# Patient Record
Sex: MALE | Race: WHITE | Employment: FULL TIME | URBAN - METROPOLITAN AREA
[De-identification: names, ages, dates, MRNs, and addresses within clinical notes are randomized per-mention and may not be internally consistent; named-entity substitution may affect disease eponyms.]

---

## 2018-01-05 ENCOUNTER — TELEPHONE (OUTPATIENT)
Dept: NEPHROLOGY | Facility: CLINIC | Age: 53
End: 2018-01-05

## 2018-01-05 NOTE — TELEPHONE ENCOUNTER
Patient hasn't been seen since about 2011 (per patient)  He was previously advised that [facility] is not seeing new patients - I advised him that he would be a new patient if he hasn't been seen since 2011 and patient states he would like to discuss this with [address]

## 2018-01-26 ENCOUNTER — OFFICE VISIT (OUTPATIENT)
Dept: NEPHROLOGY | Facility: CLINIC | Age: 53
End: 2018-01-26

## 2018-01-26 VITALS
HEIGHT: 72 IN | DIASTOLIC BLOOD PRESSURE: 88 MMHG | WEIGHT: 244 LBS | SYSTOLIC BLOOD PRESSURE: 136 MMHG | HEART RATE: 71 BPM | BODY MASS INDEX: 33.05 KG/M2

## 2018-01-26 DIAGNOSIS — Z00.00 ROUTINE MEDICAL EXAM: Primary | ICD-10-CM

## 2018-01-26 PROCEDURE — 99396 PREV VISIT EST AGE 40-64: CPT | Performed by: INTERNAL MEDICINE

## 2018-01-27 ENCOUNTER — LAB ENCOUNTER (OUTPATIENT)
Dept: LAB | Age: 53
End: 2018-01-27
Attending: INTERNAL MEDICINE
Payer: COMMERCIAL

## 2018-01-27 DIAGNOSIS — Z00.00 ROUTINE MEDICAL EXAM: ICD-10-CM

## 2018-01-27 LAB
ALBUMIN SERPL BCP-MCNC: 3.9 G/DL (ref 3.5–4.8)
ALBUMIN/GLOB SERPL: 1.3 {RATIO} (ref 1–2)
ALP SERPL-CCNC: 41 U/L (ref 32–100)
ALT SERPL-CCNC: 29 U/L (ref 17–63)
ANION GAP SERPL CALC-SCNC: 5 MMOL/L (ref 0–18)
AST SERPL-CCNC: 21 U/L (ref 15–41)
BACTERIA UR QL AUTO: NEGATIVE /HPF
BASOPHILS # BLD: 0 K/UL (ref 0–0.2)
BASOPHILS NFR BLD: 1 %
BILIRUB SERPL-MCNC: 0.9 MG/DL (ref 0.3–1.2)
BILIRUB UR QL: NEGATIVE
BUN SERPL-MCNC: 10 MG/DL (ref 8–20)
BUN/CREAT SERPL: 9 (ref 10–20)
CALCIUM SERPL-MCNC: 9.5 MG/DL (ref 8.5–10.5)
CHLORIDE SERPL-SCNC: 104 MMOL/L (ref 95–110)
CHOLEST SERPL-MCNC: 231 MG/DL (ref 110–200)
CLARITY UR: CLEAR
CO2 SERPL-SCNC: 30 MMOL/L (ref 22–32)
COLOR UR: YELLOW
CREAT SERPL-MCNC: 1.11 MG/DL (ref 0.5–1.5)
EOSINOPHIL # BLD: 0.1 K/UL (ref 0–0.7)
EOSINOPHIL NFR BLD: 2 %
ERYTHROCYTE [DISTWIDTH] IN BLOOD BY AUTOMATED COUNT: 12.7 % (ref 11–15)
GLOBULIN PLAS-MCNC: 3 G/DL (ref 2.5–3.7)
GLUCOSE SERPL-MCNC: 118 MG/DL (ref 70–99)
GLUCOSE UR-MCNC: NEGATIVE MG/DL
HCT VFR BLD AUTO: 43.3 % (ref 41–52)
HDLC SERPL-MCNC: 30 MG/DL
HGB BLD-MCNC: 14.7 G/DL (ref 13.5–17.5)
HGB UR QL STRIP.AUTO: NEGATIVE
KETONES UR-MCNC: NEGATIVE MG/DL
LDLC SERPL CALC-MCNC: 150 MG/DL (ref 0–99)
LEUKOCYTE ESTERASE UR QL STRIP.AUTO: NEGATIVE
LYMPHOCYTES # BLD: 1.2 K/UL (ref 1–4)
LYMPHOCYTES NFR BLD: 37 %
MCH RBC QN AUTO: 30.1 PG (ref 27–32)
MCHC RBC AUTO-ENTMCNC: 33.9 G/DL (ref 32–37)
MCV RBC AUTO: 88.8 FL (ref 80–100)
MONOCYTES # BLD: 0.3 K/UL (ref 0–1)
MONOCYTES NFR BLD: 10 %
NEUTROPHILS # BLD AUTO: 1.6 K/UL (ref 1.8–7.7)
NEUTROPHILS NFR BLD: 50 %
NITRITE UR QL STRIP.AUTO: NEGATIVE
NONHDLC SERPL-MCNC: 201 MG/DL
OSMOLALITY UR CALC.SUM OF ELEC: 288 MOSM/KG (ref 275–295)
PH UR: 7 [PH] (ref 5–8)
PLATELET # BLD AUTO: 173 K/UL (ref 140–400)
PMV BLD AUTO: 7.1 FL (ref 7.4–10.3)
POTASSIUM SERPL-SCNC: 4.2 MMOL/L (ref 3.3–5.1)
PROT SERPL-MCNC: 6.9 G/DL (ref 5.9–8.4)
PROT UR-MCNC: 30 MG/DL
RBC # BLD AUTO: 4.87 M/UL (ref 4.5–5.9)
RBC #/AREA URNS AUTO: 2 /HPF
SODIUM SERPL-SCNC: 139 MMOL/L (ref 136–144)
SP GR UR STRIP: 1.02 (ref 1–1.03)
TRIGL SERPL-MCNC: 256 MG/DL (ref 1–149)
TSH SERPL-ACNC: 0.82 UIU/ML (ref 0.45–5.33)
UROBILINOGEN UR STRIP-ACNC: <2
VIT C UR-MCNC: NEGATIVE MG/DL
WBC # BLD AUTO: 3.2 K/UL (ref 4–11)
WBC #/AREA URNS AUTO: <1 /HPF

## 2018-01-27 PROCEDURE — 36415 COLL VENOUS BLD VENIPUNCTURE: CPT

## 2018-01-27 PROCEDURE — 81001 URINALYSIS AUTO W/SCOPE: CPT

## 2018-01-27 PROCEDURE — 84443 ASSAY THYROID STIM HORMONE: CPT

## 2018-01-27 PROCEDURE — 85025 COMPLETE CBC W/AUTO DIFF WBC: CPT

## 2018-01-27 PROCEDURE — 80053 COMPREHEN METABOLIC PANEL: CPT

## 2018-01-27 PROCEDURE — 87389 HIV-1 AG W/HIV-1&-2 AB AG IA: CPT

## 2018-01-27 PROCEDURE — 80061 LIPID PANEL: CPT

## 2018-01-27 NOTE — PATIENT INSTRUCTIONS
Please check your blood pressures daily and call me in 1 week. Do labs as ordered. See Dr. Vick Kim for follow-up.

## 2018-01-27 NOTE — PROGRESS NOTES
[facility], [facility]  Nephrology Daily Progress Note    Khoi Centeno  [id number]  47year old  Patient presents with:  Physical      HPI:   Khoi Centeno is a 47year old male.   71-year-old male with a history of hypercholesterolemia, migraine headaches and chroni lbs   Height 72.000 - 72.000   BODY MASS INDEX 29.29 33.09 -       VITALS: WEIGHT ONLY 10/19/2011 1/26/2018   Weight 216 lbs 244 lbs       Constitutional: appears well hydrated alert and responsive no acute distress noted  Neck/Thyroid: neck is supple with follow-up colonoscopy and should also reviewed the postprandial hiccups. Will call with results and determine follow-up. See yearly or as needed.            Orders Placed This Encounter      CBC W Differential W Platelet      Comp Metabolic Panel (14)

## 2018-01-29 LAB — HIV1+2 AB SERPL QL IA: NONREACTIVE

## 2018-01-30 ENCOUNTER — TELEPHONE (OUTPATIENT)
Dept: NEPHROLOGY | Facility: CLINIC | Age: 53
End: 2018-01-30

## 2018-01-30 DIAGNOSIS — R73.9 HYPERGLYCEMIA: Primary | ICD-10-CM

## 2018-01-30 DIAGNOSIS — E78.00 PURE HYPERCHOLESTEROLEMIA: ICD-10-CM

## 2018-01-31 NOTE — TELEPHONE ENCOUNTER
Labs okay except cholesterol is elevated. Higher than it was before. Fasting blood sugar now also borderline elevated. He really needs to work on diet, exercise and weight loss. Can refer to a dietitian if he wants. HIV was negative.   White count was

## 2018-02-01 NOTE — TELEPHONE ENCOUNTER
Contacted pt. Notified him of MKK's result message below. He agrees to start working on improving diet, exercise, and weight loss. He will repeat his labs in 3 months to re-evaluate.  Explained that if there isn't enough improvement on next set of labs, [address] [facility]

## 2018-04-02 ENCOUNTER — OFFICE VISIT (OUTPATIENT)
Dept: GASTROENTEROLOGY | Facility: CLINIC | Age: 53
End: 2018-04-02

## 2018-04-02 ENCOUNTER — TELEPHONE (OUTPATIENT)
Dept: GASTROENTEROLOGY | Facility: CLINIC | Age: 53
End: 2018-04-02

## 2018-04-02 VITALS
BODY MASS INDEX: 32.37 KG/M2 | HEART RATE: 73 BPM | WEIGHT: 239 LBS | DIASTOLIC BLOOD PRESSURE: 80 MMHG | SYSTOLIC BLOOD PRESSURE: 128 MMHG | HEIGHT: 72 IN

## 2018-04-02 DIAGNOSIS — Z12.11 SCREENING FOR COLORECTAL CANCER: ICD-10-CM

## 2018-04-02 DIAGNOSIS — Z12.12 SCREENING FOR COLORECTAL CANCER: ICD-10-CM

## 2018-04-02 DIAGNOSIS — K21.9 GASTROESOPHAGEAL REFLUX DISEASE, ESOPHAGITIS PRESENCE NOT SPECIFIED: Primary | ICD-10-CM

## 2018-04-02 DIAGNOSIS — K59.1 FUNCTIONAL DIARRHEA: ICD-10-CM

## 2018-04-02 PROCEDURE — 99212 OFFICE O/P EST SF 10 MIN: CPT | Performed by: INTERNAL MEDICINE

## 2018-04-02 PROCEDURE — 99243 OFF/OP CNSLTJ NEW/EST LOW 30: CPT | Performed by: INTERNAL MEDICINE

## 2018-04-02 RX ORDER — LOPERAMIDE HYDROCHLORIDE 2 MG/1
2 CAPSULE ORAL 4 TIMES DAILY PRN
COMMUNITY
End: 2019-06-25 | Stop reason: ALTCHOICE

## 2018-04-02 NOTE — PROGRESS NOTES
HPI:    Patient ID: Shanae Reddy is a 47year old male. HPI  Nawaf Pan presents for a discussion regarding symptoms possibly due to reflux and colorectal cancer screening. He has not been seen since 2009.     As per previous notes the patient has a very long- Solution Take as directed Disp: 1 Bottle Rfl: 0     Allergies:  Radiology Contrast *    Swelling  [place] [Sulfur]        Unknown   PHYSICAL EXAM:   Physical Exam   Constitutional: He is oriented to person, place, and time.  He appears well-developed and we 275 - 295 mOsm/kg 288   GFR, Non-      >=60 >60   GFR, -American      >=60 >60   WBC      4.0 - 11.0 K/UL 3.2 (L)   RBC      4.50 - 5.90 M/UL 4.87   Hemoglobin      13.5 - 17.5 g/dL 14.7   Hematocrit      41.0 - 52.0 % 43.3   MCV not performed was discussed as well. The patient is agreeable to proceeding with a colonoscopy which will be arranged following a Suprep preparation and IV sedation. ASA 1    3.  Functional diarrhea  Symptoms are reasonably well controlled with loperamid

## 2018-04-02 NOTE — TELEPHONE ENCOUNTER
Scheduled for:  Colonoscopy 30511  Provider Name: Dr Domingo Amador  Date:  Fri 6/22/18  Location:  [facility]  Sedation:  IV  Time:  7:30 am, pt aware that [facility] will call day before procedure with arrival time  Prep: split dose suprep, hard copy given to pt  Pt aware to zoe

## 2018-04-02 NOTE — PATIENT INSTRUCTIONS
1.  Lose weight, avoid evening snacks/coffee. 2.  Raise the head of the bed with a mattress wedge or bed blocks. 3.  Please contact me if the symptoms continue. 4.  Schedule screening colonoscopy following a Suprep and IV sedation.

## 2018-06-15 ENCOUNTER — TELEPHONE (OUTPATIENT)
Dept: GASTROENTEROLOGY | Facility: CLINIC | Age: 53
End: 2018-06-15

## 2018-06-15 NOTE — TELEPHONE ENCOUNTER
Pt removed from provider schedule and cancellation form sent to [address] 1 month CLN recall placed into [facility].

## 2018-07-16 ENCOUNTER — TELEPHONE (OUTPATIENT)
Dept: GASTROENTEROLOGY | Facility: CLINIC | Age: 53
End: 2018-07-16

## 2018-07-16 NOTE — TELEPHONE ENCOUNTER
----- Message from Estella Paredes RN sent at 6/15/2018 11:18 AM CDT -----  Regardin month CLN recall reminder  Pt removed from provider schedule and cancellation form sent to [facility]. 1 month CLN recall placed into EPIC. See TE from 6/15/18, pt cancelled.

## 2018-11-10 ENCOUNTER — LAB ENCOUNTER (OUTPATIENT)
Dept: LAB | Age: 53
End: 2018-11-10
Attending: INTERNAL MEDICINE
Payer: COMMERCIAL

## 2018-11-10 DIAGNOSIS — E78.00 PURE HYPERCHOLESTEROLEMIA: ICD-10-CM

## 2018-11-10 DIAGNOSIS — R73.9 HYPERGLYCEMIA: ICD-10-CM

## 2018-11-10 PROCEDURE — 80061 LIPID PANEL: CPT

## 2018-11-10 PROCEDURE — 82947 ASSAY GLUCOSE BLOOD QUANT: CPT

## 2018-11-10 PROCEDURE — 85025 COMPLETE CBC W/AUTO DIFF WBC: CPT

## 2018-11-10 PROCEDURE — 83036 HEMOGLOBIN GLYCOSYLATED A1C: CPT

## 2018-11-10 PROCEDURE — 36415 COLL VENOUS BLD VENIPUNCTURE: CPT

## 2018-11-12 ENCOUNTER — TELEPHONE (OUTPATIENT)
Dept: NEPHROLOGY | Facility: CLINIC | Age: 53
End: 2018-11-12

## 2018-11-12 DIAGNOSIS — E78.00 PURE HYPERCHOLESTEROLEMIA: Primary | ICD-10-CM

## 2018-11-12 DIAGNOSIS — R73.9 ELEVATED BLOOD SUGAR: ICD-10-CM

## 2018-11-12 NOTE — TELEPHONE ENCOUNTER
Contacted pt. Relayed MKK's message below. He admits that he wasn't good about eating healthier and exercising regularly last time.  I gave him the actual comparisons to his LDL and how it's increased and explained the cardiovascular risk factor this entail

## 2018-11-12 NOTE — TELEPHONE ENCOUNTER
Patient contacted. Results message read to patient. He states he has not really been watching his diet and exercise very carefully and would like to try doing this first before starting any medication.  He would like to know when he should do lab work again

## 2018-11-12 NOTE — TELEPHONE ENCOUNTER
Blood sugars still borderline high but a little better. Chol still too high. Start Crestor 10 mg 1 qd #30, refills 3. Liver and lipid panel in 1 mo. Watch diet and exercise.

## 2019-04-15 ENCOUNTER — TELEPHONE (OUTPATIENT)
Dept: NEPHROLOGY | Facility: CLINIC | Age: 54
End: 2019-04-15

## 2019-05-26 ENCOUNTER — APPOINTMENT (OUTPATIENT)
Dept: LAB | Facility: HOSPITAL | Age: 54
End: 2019-05-26
Attending: INTERNAL MEDICINE
Payer: COMMERCIAL

## 2019-05-26 DIAGNOSIS — E78.00 PURE HYPERCHOLESTEROLEMIA: ICD-10-CM

## 2019-05-26 DIAGNOSIS — R73.9 ELEVATED BLOOD SUGAR: ICD-10-CM

## 2019-05-26 PROCEDURE — 83036 HEMOGLOBIN GLYCOSYLATED A1C: CPT

## 2019-05-26 PROCEDURE — 82947 ASSAY GLUCOSE BLOOD QUANT: CPT

## 2019-05-26 PROCEDURE — 36415 COLL VENOUS BLD VENIPUNCTURE: CPT

## 2019-05-26 PROCEDURE — 80061 LIPID PANEL: CPT

## 2019-05-28 ENCOUNTER — TELEPHONE (OUTPATIENT)
Dept: NEPHROLOGY | Facility: CLINIC | Age: 54
End: 2019-05-28

## 2019-05-28 NOTE — TELEPHONE ENCOUNTER
Pt is calling to see when his last measle shot and if he is due and also if they are done in office?

## 2019-05-28 NOTE — TELEPHONE ENCOUNTER
He may have had the measles vaccine when he was a child but we have no way of knowing that. See if his parents know.

## 2019-05-28 NOTE — TELEPHONE ENCOUNTER
No measles vaccine listed in Adult immunization record. Routed to Dr. Ramonia Kayser to advise. News media is telling people to get a booster. Please advise.

## 2019-05-28 NOTE — TELEPHONE ENCOUNTER
Patient contacted. He did check with his mother and she thinks that patient did get a measles vaccine as a child.

## 2019-05-30 ENCOUNTER — TELEPHONE (OUTPATIENT)
Dept: NEPHROLOGY | Facility: CLINIC | Age: 54
End: 2019-05-30

## 2019-05-30 NOTE — TELEPHONE ENCOUNTER
Blood sugars and cholesterol remain too high. Please follow-up as scheduled and will review in more detail.

## 2019-05-31 NOTE — TELEPHONE ENCOUNTER
Pt returned call. Notified him of [facility]'s results message below from 5/30/19. Pt will f/u with MKK on 6/14/19.

## 2019-06-14 ENCOUNTER — OFFICE VISIT (OUTPATIENT)
Dept: NEPHROLOGY | Facility: CLINIC | Age: 54
End: 2019-06-14
Payer: COMMERCIAL

## 2019-06-14 VITALS
HEART RATE: 75 BPM | BODY MASS INDEX: 31.65 KG/M2 | SYSTOLIC BLOOD PRESSURE: 147 MMHG | WEIGHT: 238.81 LBS | HEIGHT: 73 IN | DIASTOLIC BLOOD PRESSURE: 94 MMHG

## 2019-06-14 DIAGNOSIS — R27.0 ATAXIA: ICD-10-CM

## 2019-06-14 DIAGNOSIS — E78.00 PURE HYPERCHOLESTEROLEMIA: ICD-10-CM

## 2019-06-14 DIAGNOSIS — Z00.00 ROUTINE MEDICAL EXAM: Primary | ICD-10-CM

## 2019-06-14 DIAGNOSIS — R73.9 HYPERGLYCEMIA: ICD-10-CM

## 2019-06-14 PROCEDURE — 99212 OFFICE O/P EST SF 10 MIN: CPT | Performed by: INTERNAL MEDICINE

## 2019-06-14 PROCEDURE — 99396 PREV VISIT EST AGE 40-64: CPT | Performed by: INTERNAL MEDICINE

## 2019-06-14 RX ORDER — ZOLPIDEM TARTRATE 10 MG/1
TABLET ORAL NIGHTLY PRN
Qty: 10 TABLET | Refills: 0 | Status: SHIPPED | OUTPATIENT
Start: 2019-06-14 | End: 2020-07-31

## 2019-06-15 NOTE — PATIENT INSTRUCTIONS
Please do labs as ordered. Watch diet and exercise and repeat your lipid panel, glucose and A1c in 3 months. See Dr. Aldo Shirley fora  follow-up colonoscopy. See general surgery regarding her hernias.   Check your blood pressures daily and call me in 2

## 2019-06-15 NOTE — PROGRESS NOTES
[facility], [facility]  Nephrology Daily Progress Note    Socorro Benavides  [id number]  47year old  Patient presents with: Annual      HPI:   Socorro Benavides is a 47year old male.   50-year-old male with a history of hypercholesterolemia, migraine headaches and chronic symptoms  Respiratory:  Negative for cough, dyspnea and wheezing      PHYSICAL EXAM:     Vitals History 4/2/2018 6/14/2019 6/14/2019   /80 - 147/94   BP Location Left arm - -   Patient Position Sitting - -   Cuff Size large - -   Pulse - - 75   Weigh 17.5-3.13-1.6 GM/180ML Oral Solution, Take as directed, Disp: 1 Bottle, Rfl: 0    Allergies:    Radiology Contrast *    SWELLING  [place] [Sulfur]        UNKNOWN         ASSESSMENT/PLAN:   Assessment   Routine medical exam  (primary encounter diagnosis)  P Cortney Shepherd (Automated)      AILIN, Direct, Reflex Titer + Spec AB      Hemoglobin A1C (Glycohemoglobin) [E]      Glucose, Serum [E]      Lipid Panel      6/14/2019  Sang Santana MD

## 2019-06-19 ENCOUNTER — TELEPHONE (OUTPATIENT)
Dept: NEPHROLOGY | Facility: CLINIC | Age: 54
End: 2019-06-19

## 2019-06-19 NOTE — TELEPHONE ENCOUNTER
Pt has questions re: lab orders and who he is being referred to for hernia repairs. Pls call - aware MKK is not in office. Thank you.

## 2019-06-21 ENCOUNTER — TELEPHONE (OUTPATIENT)
Dept: GASTROENTEROLOGY | Facility: CLINIC | Age: 54
End: 2019-06-21

## 2019-06-21 DIAGNOSIS — Z12.11 COLON CANCER SCREENING: Primary | ICD-10-CM

## 2019-06-21 NOTE — TELEPHONE ENCOUNTER
The patient's chart has been reviewed. Okay to schedule pt for CLN r/t screening for CLN CA with Dr. Tony Aguilar. Advise IV Twilight or MAC sedation with split dose Suprep or equivalent (eRx) preparation.     -Please note:  It is the patient's responsi

## 2019-06-21 NOTE — TELEPHONE ENCOUNTER
Anticoagulants: no  Diabetic Meds: no  BP meds(Ace inhibitors/ARB's): no  Weight loss meds (phentermine): no  Iron supplement (RX/OTC):no  Height & Weight/BMI: 238/6'1/31.51  Cardiac/CVA issues/Pacemaker/Defibrillator/Stent: no  Resp.  Issues/SHANTELL/Anesthes

## 2019-06-23 ENCOUNTER — LAB ENCOUNTER (OUTPATIENT)
Dept: LAB | Facility: HOSPITAL | Age: 54
End: 2019-06-23
Attending: INTERNAL MEDICINE
Payer: COMMERCIAL

## 2019-06-23 DIAGNOSIS — R73.9 HYPERGLYCEMIA: ICD-10-CM

## 2019-06-23 DIAGNOSIS — E78.00 PURE HYPERCHOLESTEROLEMIA: ICD-10-CM

## 2019-06-23 DIAGNOSIS — Z00.00 ROUTINE MEDICAL EXAM: ICD-10-CM

## 2019-06-23 DIAGNOSIS — R27.0 ATAXIA: ICD-10-CM

## 2019-06-23 PROCEDURE — 84443 ASSAY THYROID STIM HORMONE: CPT

## 2019-06-23 PROCEDURE — 86225 DNA ANTIBODY NATIVE: CPT

## 2019-06-23 PROCEDURE — 83036 HEMOGLOBIN GLYCOSYLATED A1C: CPT

## 2019-06-23 PROCEDURE — 85652 RBC SED RATE AUTOMATED: CPT

## 2019-06-23 PROCEDURE — 86038 ANTINUCLEAR ANTIBODIES: CPT

## 2019-06-23 PROCEDURE — 86235 NUCLEAR ANTIGEN ANTIBODY: CPT

## 2019-06-23 PROCEDURE — 36415 COLL VENOUS BLD VENIPUNCTURE: CPT

## 2019-06-23 PROCEDURE — 80053 COMPREHEN METABOLIC PANEL: CPT

## 2019-06-23 PROCEDURE — 81003 URINALYSIS AUTO W/O SCOPE: CPT

## 2019-06-23 PROCEDURE — 82607 VITAMIN B-12: CPT

## 2019-06-23 PROCEDURE — 86039 ANTINUCLEAR ANTIBODIES (ANA): CPT

## 2019-06-23 PROCEDURE — 80061 LIPID PANEL: CPT

## 2019-06-23 PROCEDURE — 85025 COMPLETE CBC W/AUTO DIFF WBC: CPT

## 2019-06-24 NOTE — TELEPHONE ENCOUNTER
Scheduled for:  Colonoscopy - 98345  Provider Name:  Dr. Zachery May  Date:  10/4/19  Location:  [facility]  Sedation:  MAC  Time:  1:45 pm (pt is aware to arrive at 12:45 pm)  Prep:  Suprep, Prep instructions were given to pt over the phone, pt verbalized understanding.

## 2019-06-25 ENCOUNTER — OFFICE VISIT (OUTPATIENT)
Dept: SURGERY | Facility: CLINIC | Age: 54
End: 2019-06-25
Payer: COMMERCIAL

## 2019-06-25 VITALS — BODY MASS INDEX: 31 KG/M2 | WEIGHT: 238 LBS

## 2019-06-25 DIAGNOSIS — K42.9 UMBILICAL HERNIA WITHOUT OBSTRUCTION AND WITHOUT GANGRENE: Primary | ICD-10-CM

## 2019-06-25 DIAGNOSIS — M62.08 RECTUS DIASTASIS: ICD-10-CM

## 2019-06-25 PROCEDURE — 99204 OFFICE O/P NEW MOD 45 MIN: CPT | Performed by: SURGERY

## 2019-06-25 PROCEDURE — 99212 OFFICE O/P EST SF 10 MIN: CPT | Performed by: SURGERY

## 2019-06-26 PROBLEM — M62.08 RECTUS DIASTASIS: Status: ACTIVE | Noted: 2019-06-26

## 2019-06-26 PROBLEM — K42.9 UMBILICAL HERNIA WITHOUT OBSTRUCTION AND WITHOUT GANGRENE: Status: ACTIVE | Noted: 2019-06-26

## 2019-06-27 NOTE — H&P
HPI:    Patient ID: Dayna Mujica is a 47year old male presenting with Patient presents with:  Hernia: Patient states Dr. Brenda Caldwell found 2 hernias during a regular exam. One is a ventral hernia and the other is a umbilical hernia.   Patient states the umbilic Relationship status: Not on file      Intimate partner violence:        Fear of current or ex partner: Not on file        Emotionally abused: Not on file        Physically abused: Not on file        Forced sexual activity: Not on file    Other Topics Diagnoses and all orders for this visit:    Umbilical hernia without obstruction and without gangrene    Rectus diastasis    Discussed w pt the option for an umbilical hernia repair with mesh versus retrorectus approach with medialization of the rectus mus

## 2019-06-28 ENCOUNTER — TELEPHONE (OUTPATIENT)
Dept: SURGERY | Facility: CLINIC | Age: 54
End: 2019-06-28

## 2019-06-30 ENCOUNTER — TELEPHONE (OUTPATIENT)
Dept: NEPHROLOGY | Facility: CLINIC | Age: 54
End: 2019-06-30

## 2019-06-30 DIAGNOSIS — E78.00 PURE HYPERCHOLESTEROLEMIA: Primary | ICD-10-CM

## 2019-06-30 DIAGNOSIS — R73.9 HYPERGLYCEMIA: ICD-10-CM

## 2019-06-30 NOTE — TELEPHONE ENCOUNTER
Labs overall look good except fasting blood sugar remains too high as is his cholesterol. Work on diet, exercise and weight loss. Start Crestor 10 mg 1 daily, #30, refills 5. Repeat liver and lipid panel in 1 month. How are blood pressures?

## 2019-07-01 NOTE — TELEPHONE ENCOUNTER
Contacted pt and nfied him of [facility]'s results message below from 6/30/19. He states he had a conversation with MKK at his visit on 6/14/19 that he was going to work in diet and exercise first and then have cholesterol rechecked to see if he still needed medication so he would like to proceed with this rather than start medication at this time. It looks like the labs that pt was suppose to do in 3 months (fasting glucose, A1C, and lipid panel) ended up being done on 6/23/19. I re-entered these lab orders to be done in 3 months. Pt reports he saw surgeon, Dr. Kathleen Toth. He has a ventral hernia, umbilical hernia, and   Diastasis recti. Working on getting surgery scheduled by the end of the year. Has colonoscopy scheduled for 10/4/19. He will send a [employer] message with [facility].

## 2019-09-09 ENCOUNTER — PATIENT MESSAGE (OUTPATIENT)
Dept: GASTROENTEROLOGY | Facility: CLINIC | Age: 54
End: 2019-09-09

## 2019-09-09 NOTE — TELEPHONE ENCOUNTER
From: Marie Chapman  To: Umair Chew MD  Sent: 9/9/2019 12:00 PM CDT  Subject: Other    I have a colonoscopy scheduled for October 4 and was wondering if a slot had opened for either September 20 or 27 due to cancellation.  If not, I will stick with

## 2019-09-15 ENCOUNTER — APPOINTMENT (OUTPATIENT)
Dept: LAB | Facility: HOSPITAL | Age: 54
End: 2019-09-15
Attending: INTERNAL MEDICINE
Payer: COMMERCIAL

## 2019-09-15 DIAGNOSIS — R73.9 HYPERGLYCEMIA: ICD-10-CM

## 2019-09-15 DIAGNOSIS — E78.00 PURE HYPERCHOLESTEROLEMIA: ICD-10-CM

## 2019-09-15 LAB
CHOLEST SMN-MCNC: 241 MG/DL (ref ?–200)
EST. AVERAGE GLUCOSE BLD GHB EST-MCNC: 128 MG/DL (ref 68–126)
GLUCOSE BLD-MCNC: 108 MG/DL (ref 70–99)
HBA1C MFR BLD HPLC: 6.1 % (ref ?–5.7)
HDLC SERPL-MCNC: 34 MG/DL (ref 40–59)
LDLC SERPL CALC-MCNC: 153 MG/DL (ref ?–100)
NONHDLC SERPL-MCNC: 207 MG/DL (ref ?–130)
PATIENT FASTING: YES
PATIENT FASTING: YES
TRIGL SERPL-MCNC: 272 MG/DL (ref 30–149)
VLDLC SERPL CALC-MCNC: 54 MG/DL (ref 0–30)

## 2019-09-15 PROCEDURE — 83036 HEMOGLOBIN GLYCOSYLATED A1C: CPT

## 2019-09-15 PROCEDURE — 80061 LIPID PANEL: CPT

## 2019-09-15 PROCEDURE — 36415 COLL VENOUS BLD VENIPUNCTURE: CPT

## 2019-09-15 PROCEDURE — 82947 ASSAY GLUCOSE BLOOD QUANT: CPT

## 2019-09-24 ENCOUNTER — OFFICE VISIT (OUTPATIENT)
Dept: NEPHROLOGY | Facility: CLINIC | Age: 54
End: 2019-09-24
Payer: COMMERCIAL

## 2019-09-24 ENCOUNTER — OFFICE VISIT (OUTPATIENT)
Dept: SURGERY | Facility: CLINIC | Age: 54
End: 2019-09-24
Payer: COMMERCIAL

## 2019-09-24 VITALS
DIASTOLIC BLOOD PRESSURE: 88 MMHG | HEIGHT: 72 IN | BODY MASS INDEX: 34.22 KG/M2 | HEART RATE: 84 BPM | WEIGHT: 252.63 LBS | SYSTOLIC BLOOD PRESSURE: 139 MMHG

## 2019-09-24 VITALS — BODY MASS INDEX: 31.54 KG/M2 | WEIGHT: 238 LBS | HEIGHT: 73 IN

## 2019-09-24 DIAGNOSIS — E78.00 HYPERCHOLESTEREMIA: Primary | ICD-10-CM

## 2019-09-24 DIAGNOSIS — I10 ESSENTIAL HYPERTENSION: ICD-10-CM

## 2019-09-24 DIAGNOSIS — E78.00 PURE HYPERCHOLESTEROLEMIA: ICD-10-CM

## 2019-09-24 DIAGNOSIS — R05.9 COUGH: ICD-10-CM

## 2019-09-24 DIAGNOSIS — K43.9 VENTRAL HERNIA WITHOUT OBSTRUCTION OR GANGRENE: Primary | ICD-10-CM

## 2019-09-24 PROCEDURE — 99211 OFF/OP EST MAY X REQ PHY/QHP: CPT | Performed by: SURGERY

## 2019-09-24 PROCEDURE — 99214 OFFICE O/P EST MOD 30 MIN: CPT | Performed by: INTERNAL MEDICINE

## 2019-09-24 NOTE — PROGRESS NOTES
[facility], [facility]  Nephrology Daily Progress Note    Ousmane Staples  [id number]  47year old  Patient presents with:  Hypertension: follow up  Test Results      HPI:   Ousmane Staples is a 47year old male.   21-year-old male with a history of hypercholesterolemia, symptoms  Neurological:  Negative for gait disturbance  Psychiatric:  Negative for inappropriate interaction and psychiatric symptoms  Respiratory:  Negative for cough, dyspnea and wheezing      PHYSICAL EXAM:     Vitals History 9/24/2019 9/24/2019 9/24/20 Medications:   •  amLODIPine Besylate 5 MG Oral Tab, Take 1 tablet (5 mg total) by mouth daily. , Disp: 30 tablet, Rfl: 5  •  Zolpidem Tartrate 10 MG Oral Tab, Take 0.5-1 tablets (5-10 mg total) by mouth nightly as needed for Sleep., Disp: 10 tablet, Rfl: 0

## 2019-09-24 NOTE — PROGRESS NOTES
Patient presents with:  Hernia: Pt is here to DX surgery that is scheduled on 10/9/2019. Ht 6' 1\" (1.854 m)   Wt 238 lb (108 kg)   BMI 31.40 kg/m²   Gen:  NAD      AP: Ventral hernia with rectus diastasis. All questions answered.   Plan to proceed wi

## 2019-09-24 NOTE — PATIENT INSTRUCTIONS
Start amlodipine. Check your blood pressures daily and call me in 1 week. Do chest x-ray and coronary artery calcium score test as ordered.

## 2019-09-30 ENCOUNTER — PATIENT MESSAGE (OUTPATIENT)
Dept: NEPHROLOGY | Facility: CLINIC | Age: 54
End: 2019-09-30

## 2019-09-30 RX ORDER — AMLODIPINE BESYLATE 5 MG/1
TABLET ORAL
Qty: 45 TABLET | Refills: 5 | Status: SHIPPED | OUTPATIENT
Start: 2019-09-30 | End: 2019-11-05 | Stop reason: DRUGHIGH

## 2019-09-30 NOTE — TELEPHONE ENCOUNTER
From: Lisy Oneal  To: Tara Damico MD  Sent: 9/30/2019 12:21 PM CDT  Subject: Visit Follow-up Question    Dr. Christopher Vega had asked for my blood pressure readings after my visit on Sep 24, at which he told me to begin taking the prescribed BP medication,

## 2019-09-30 NOTE — TELEPHONE ENCOUNTER
Blood pressure still little high. Increase amlodipine to 7.5 mg daily. Call in 2 weeks with blood pressures.

## 2019-10-04 ENCOUNTER — HOSPITAL ENCOUNTER (OUTPATIENT)
Facility: HOSPITAL | Age: 54
Setting detail: HOSPITAL OUTPATIENT SURGERY
Discharge: HOME | End: 2019-10-04
Attending: INTERNAL MEDICINE | Admitting: INTERNAL MEDICINE
Payer: COMMERCIAL

## 2019-10-04 ENCOUNTER — ANESTHESIA EVENT (OUTPATIENT)
Dept: ENDOSCOPY | Facility: HOSPITAL | Age: 54
End: 2019-10-04
Payer: COMMERCIAL

## 2019-10-04 ENCOUNTER — ANESTHESIA (OUTPATIENT)
Dept: ENDOSCOPY | Facility: HOSPITAL | Age: 54
End: 2019-10-04
Payer: COMMERCIAL

## 2019-10-04 VITALS
BODY MASS INDEX: 33.86 KG/M2 | DIASTOLIC BLOOD PRESSURE: 89 MMHG | HEIGHT: 72 IN | WEIGHT: 250 LBS | SYSTOLIC BLOOD PRESSURE: 138 MMHG | OXYGEN SATURATION: 96 % | HEART RATE: 83 BPM | RESPIRATION RATE: 16 BRPM

## 2019-10-04 DIAGNOSIS — K37 APPENDICITIS, UNSPECIFIED APPENDICITIS TYPE: ICD-10-CM

## 2019-10-04 DIAGNOSIS — Z91.041 CONTRAST MEDIA ALLERGY: ICD-10-CM

## 2019-10-04 DIAGNOSIS — Z12.11 COLON CANCER SCREENING: ICD-10-CM

## 2019-10-04 DIAGNOSIS — K36: Primary | ICD-10-CM

## 2019-10-04 DIAGNOSIS — K57.90 DIVERTICULOSIS: Primary | ICD-10-CM

## 2019-10-04 PROCEDURE — 45380 COLONOSCOPY AND BIOPSY: CPT | Performed by: INTERNAL MEDICINE

## 2019-10-04 PROCEDURE — 0DBJ8ZX EXCISION OF APPENDIX, VIA NATURAL OR ARTIFICIAL OPENING ENDOSCOPIC, DIAGNOSTIC: ICD-10-PCS | Performed by: INTERNAL MEDICINE

## 2019-10-04 PROCEDURE — 0DBE8ZX EXCISION OF LARGE INTESTINE, VIA NATURAL OR ARTIFICIAL OPENING ENDOSCOPIC, DIAGNOSTIC: ICD-10-PCS | Performed by: INTERNAL MEDICINE

## 2019-10-04 RX ORDER — DEXAMETHASONE SODIUM PHOSPHATE 4 MG/ML
VIAL (ML) INJECTION AS NEEDED
Status: DISCONTINUED | OUTPATIENT
Start: 2019-10-04 | End: 2019-10-04 | Stop reason: SURG

## 2019-10-04 RX ORDER — LIDOCAINE HYDROCHLORIDE 10 MG/ML
INJECTION, SOLUTION EPIDURAL; INFILTRATION; INTRACAUDAL; PERINEURAL AS NEEDED
Status: DISCONTINUED | OUTPATIENT
Start: 2019-10-04 | End: 2019-10-04 | Stop reason: SURG

## 2019-10-04 RX ORDER — NALOXONE HYDROCHLORIDE 0.4 MG/ML
80 INJECTION, SOLUTION INTRAMUSCULAR; INTRAVENOUS; SUBCUTANEOUS AS NEEDED
Status: DISCONTINUED | OUTPATIENT
Start: 2019-10-04 | End: 2019-10-04

## 2019-10-04 RX ORDER — SODIUM CHLORIDE, SODIUM LACTATE, POTASSIUM CHLORIDE, CALCIUM CHLORIDE 600; 310; 30; 20 MG/100ML; MG/100ML; MG/100ML; MG/100ML
INJECTION, SOLUTION INTRAVENOUS CONTINUOUS
Status: DISCONTINUED | OUTPATIENT
Start: 2019-10-04 | End: 2019-10-04

## 2019-10-04 RX ORDER — CIPROFLOXACIN 500 MG/1
500 TABLET, FILM COATED ORAL 2 TIMES DAILY
Qty: 20 TABLET | Refills: 0 | Status: SHIPPED | OUTPATIENT
Start: 2019-10-04 | End: 2019-10-31 | Stop reason: ALTCHOICE

## 2019-10-04 RX ORDER — ONDANSETRON 2 MG/ML
INJECTION INTRAMUSCULAR; INTRAVENOUS AS NEEDED
Status: DISCONTINUED | OUTPATIENT
Start: 2019-10-04 | End: 2019-10-04 | Stop reason: SURG

## 2019-10-04 RX ORDER — PREDNISONE 50 MG/1
TABLET ORAL
Qty: 3 TABLET | Refills: 0 | Status: SHIPPED | OUTPATIENT
Start: 2019-10-04 | End: 2020-07-31 | Stop reason: ALTCHOICE

## 2019-10-04 RX ADMIN — SODIUM CHLORIDE, SODIUM LACTATE, POTASSIUM CHLORIDE, CALCIUM CHLORIDE: 600; 310; 30; 20 INJECTION, SOLUTION INTRAVENOUS at 15:02:00

## 2019-10-04 RX ADMIN — SODIUM CHLORIDE, SODIUM LACTATE, POTASSIUM CHLORIDE, CALCIUM CHLORIDE: 600; 310; 30; 20 INJECTION, SOLUTION INTRAVENOUS at 14:30:00

## 2019-10-04 RX ADMIN — DEXAMETHASONE SODIUM PHOSPHATE 4 MG: 4 MG/ML VIAL (ML) INJECTION at 14:39:00

## 2019-10-04 RX ADMIN — LIDOCAINE HYDROCHLORIDE 50 MG: 10 INJECTION, SOLUTION EPIDURAL; INFILTRATION; INTRACAUDAL; PERINEURAL at 14:31:00

## 2019-10-04 RX ADMIN — ONDANSETRON 4 MG: 2 INJECTION INTRAMUSCULAR; INTRAVENOUS at 14:39:00

## 2019-10-04 NOTE — ANESTHESIA PREPROCEDURE EVALUATION
Anesthesia PreOp Note    HPI:     Ousmane Staples is a 47year old male who presents for preoperative consultation requested by: Wilbur Goldberg, MD    Date of Surgery: 10/4/2019    Procedure(s):  COLONOSCOPY  Indication: Colon cancer screening    Asia Sake Not on file    Occupational History      Not on file    Social Needs      Financial resource strain: Not on file      Food insecurity:        Worry: Not on file        Inability: Not on file      Transportation needs:        Medical: Not on file        Non Cardiovascular   (+) hypertension well controlled,     Rhythm: regular  Rate: normal    Neuro/Psych      GI/Hepatic/Renal    (+) bowel prep    Endo/Other    Abdominal                Anesthesia Plan:   ASA:  2  Plan:   MAC  Informed Consent Plan and Risks D

## 2019-10-04 NOTE — H&P
History & Physical Examination    Patient Name: Zack Rosales  MRN: [id number]  [facility]: [id number]  YOB: 1965    Diagnosis: Colorectal cancer screening        No medications prior to admission.     No current facility-administered medications for

## 2019-10-04 NOTE — OPERATIVE REPORT
[facility] Endoscopy Report      Date of Procedure:  10/04/19      Preoperative Diagnosis:  1. Colorectal cancer screening  2. Chronic diarrhea      Postoperative Diagnosis:  1.  Bulging appendix with purulence  2.   Sigmoid colon divertic complication. Impression:  1.  Bulging appendix with purulence suggesting occult appendicitis. Discussed with Dr. Kayley Esparza.  2.  Sigmoid colon diverticulosis, currently uncomplicated. Recommendations:  1. Follow-up biopsy results.   2.  Antibiotic ther

## 2019-10-04 NOTE — H&P (VIEW-ONLY)
History & Physical Examination    Patient Name: Asael Ziegler  MRN: [id number]  [facility]: [id number]  YOB: 1965    Diagnosis: Colorectal cancer screening        No medications prior to admission.     No current facility-administered medications for

## 2019-10-04 NOTE — ANESTHESIA POSTPROCEDURE EVALUATION
Patient: Leonides Brenner    Procedure Summary     Date:  10/04/19 Room / Location:  [facility] ENDOSCOPY 05 / [facility] ENDOSCOPY    Anesthesia Start:  2273 Anesthesia Stop:      Procedure:  COLONOSCOPY (N/A ) Diagnosis:       Colon cancer screening      (Bulging appendix,

## 2019-10-05 ENCOUNTER — PATIENT MESSAGE (OUTPATIENT)
Dept: GASTROENTEROLOGY | Facility: CLINIC | Age: 54
End: 2019-10-05

## 2019-10-07 NOTE — PROGRESS NOTES
Patient was called and informed CT scan PA has been approved, I gave him the number to central scheduling to schedule.  Pt also was asking if he could have surgeries for appendix together with upcoming hernia repair I informed the patient to f/u with his blake

## 2019-10-07 NOTE — TELEPHONE ENCOUNTER
From: Dayna Mujica  To: Juan J Jimenez MD  Sent: 10/5/2019 9:15 PM CDT  Subject: Visit Follow-up Question    Thank you for your comprehensive and patient explanation of what was done and the results of Friday’s colonoscopy.  I started the antibiotic r

## 2019-10-08 ENCOUNTER — HOSPITAL ENCOUNTER (OUTPATIENT)
Dept: CT IMAGING | Age: 54
Discharge: HOME | End: 2019-10-08
Attending: INTERNAL MEDICINE

## 2019-10-08 DIAGNOSIS — E78.00 HYPERCHOLESTEREMIA: ICD-10-CM

## 2019-10-08 NOTE — PROGRESS NOTES
[facility] [address] SCORE=1.73  CR=314/82  Cholestec labs as follows:labs done 9/15/19  TC=  HDL=  LDL=  TG=  GLUCOSE=  All results and risk factors discussed with patient; all questions and concerns addressed.   Educational hand

## 2019-10-09 ENCOUNTER — PATIENT MESSAGE (OUTPATIENT)
Dept: GASTROENTEROLOGY | Facility: CLINIC | Age: 54
End: 2019-10-09

## 2019-10-09 NOTE — PROGRESS NOTES
Patient advised to take diphenhydrAMINE one (1) 50mg tablet by mouth one (1) hour before the examination. Patient verbalized message was understood and had no further questions.

## 2019-10-09 NOTE — TELEPHONE ENCOUNTER
From: René Garcias  To: Luciana Emmanuel MD  Sent: 10/9/2019 1:38 PM CDT  Subject: Prescription Question    Dr. Leigh Carter,  I am scheduled this Saturday (Oct 12) for the CT scan you requested after last Friday’s colonoscopy.  You had prescribed Predn

## 2019-10-10 ENCOUNTER — TELEPHONE (OUTPATIENT)
Dept: NEPHROLOGY | Facility: CLINIC | Age: 54
End: 2019-10-10

## 2019-10-10 DIAGNOSIS — E78.00 ELEVATED CHOLESTEROL: ICD-10-CM

## 2019-10-10 DIAGNOSIS — K76.9 HEPATIC LESION: Primary | ICD-10-CM

## 2019-10-10 NOTE — TELEPHONE ENCOUNTER
Coronary artery calcium score was only 1.7. This is just minimally elevated. For now he can continue to watch diet and exercise. Repeat lipid panel in 3 months. The CT scan over read also did detect some lesions in the liver.   They are probably cysts w

## 2019-10-12 ENCOUNTER — HOSPITAL ENCOUNTER (OUTPATIENT)
Dept: CT IMAGING | Facility: HOSPITAL | Age: 54
Discharge: HOME | End: 2019-10-12
Attending: INTERNAL MEDICINE
Payer: COMMERCIAL

## 2019-10-12 DIAGNOSIS — K36: ICD-10-CM

## 2019-10-12 PROCEDURE — 82565 ASSAY OF CREATININE: CPT

## 2019-10-12 PROCEDURE — 74177 CT ABD & PELVIS W/CONTRAST: CPT | Performed by: INTERNAL MEDICINE

## 2019-10-15 ENCOUNTER — TELEPHONE (OUTPATIENT)
Dept: GASTROENTEROLOGY | Facility: CLINIC | Age: 54
End: 2019-10-15

## 2019-10-15 NOTE — TELEPHONE ENCOUNTER
Entered into Epic:Recall colon in 10 years per Dr. Mara Soto. Last Colon done 10/4/2019, next due 10/4/2029. HM updated.

## 2019-10-15 NOTE — TELEPHONE ENCOUNTER
----- Message from Costa Vigil MD sent at 10/14/2019  6:40 PM CDT -----  I spoke to García Mendes. The CT scan confirms focal thickening and enhancement at the base of the appendix suggesting either inflammation or neoplasm.   We also discussed the finding

## 2019-10-16 ENCOUNTER — HOSPITAL ENCOUNTER (OUTPATIENT)
Facility: HOSPITAL | Age: 54
Setting detail: HOSPITAL OUTPATIENT SURGERY
Discharge: HOME | End: 2019-10-16
Attending: SURGERY | Admitting: SURGERY
Payer: COMMERCIAL

## 2019-10-16 ENCOUNTER — ANESTHESIA EVENT (OUTPATIENT)
Dept: SURGERY | Facility: HOSPITAL | Age: 54
End: 2019-10-16
Payer: COMMERCIAL

## 2019-10-16 ENCOUNTER — ANESTHESIA (OUTPATIENT)
Dept: SURGERY | Facility: HOSPITAL | Age: 54
End: 2019-10-16
Payer: COMMERCIAL

## 2019-10-16 VITALS
WEIGHT: 248 LBS | SYSTOLIC BLOOD PRESSURE: 132 MMHG | RESPIRATION RATE: 16 BRPM | BODY MASS INDEX: 33.59 KG/M2 | OXYGEN SATURATION: 92 % | HEIGHT: 72 IN | TEMPERATURE: 98 F | DIASTOLIC BLOOD PRESSURE: 81 MMHG | HEART RATE: 75 BPM

## 2019-10-16 DIAGNOSIS — K42.9 UMBILICAL HERNIA WITHOUT OBSTRUCTION OR GANGRENE: ICD-10-CM

## 2019-10-16 PROCEDURE — 0WQF0ZZ REPAIR ABDOMINAL WALL, OPEN APPROACH: ICD-10-PCS | Performed by: SURGERY

## 2019-10-16 PROCEDURE — 44970 LAPAROSCOPY APPENDECTOMY: CPT | Performed by: SURGERY

## 2019-10-16 PROCEDURE — 0DTJ4ZZ RESECTION OF APPENDIX, PERCUTANEOUS ENDOSCOPIC APPROACH: ICD-10-PCS | Performed by: SURGERY

## 2019-10-16 RX ORDER — HYDROMORPHONE HYDROCHLORIDE 1 MG/ML
0.6 INJECTION, SOLUTION INTRAMUSCULAR; INTRAVENOUS; SUBCUTANEOUS EVERY 5 MIN PRN
Status: DISCONTINUED | OUTPATIENT
Start: 2019-10-16 | End: 2019-10-16

## 2019-10-16 RX ORDER — DEXAMETHASONE SODIUM PHOSPHATE 4 MG/ML
VIAL (ML) INJECTION AS NEEDED
Status: DISCONTINUED | OUTPATIENT
Start: 2019-10-16 | End: 2019-10-16 | Stop reason: SURG

## 2019-10-16 RX ORDER — SODIUM CHLORIDE, SODIUM LACTATE, POTASSIUM CHLORIDE, CALCIUM CHLORIDE 600; 310; 30; 20 MG/100ML; MG/100ML; MG/100ML; MG/100ML
INJECTION, SOLUTION INTRAVENOUS CONTINUOUS
Status: DISCONTINUED | OUTPATIENT
Start: 2019-10-16 | End: 2019-10-16

## 2019-10-16 RX ORDER — MORPHINE SULFATE 10 MG/ML
6 INJECTION, SOLUTION INTRAMUSCULAR; INTRAVENOUS EVERY 10 MIN PRN
Status: DISCONTINUED | OUTPATIENT
Start: 2019-10-16 | End: 2019-10-16

## 2019-10-16 RX ORDER — MORPHINE SULFATE 4 MG/ML
2 INJECTION, SOLUTION INTRAMUSCULAR; INTRAVENOUS EVERY 10 MIN PRN
Status: DISCONTINUED | OUTPATIENT
Start: 2019-10-16 | End: 2019-10-16

## 2019-10-16 RX ORDER — HYDROCODONE BITARTRATE AND ACETAMINOPHEN 5; 325 MG/1; MG/1
1 TABLET ORAL EVERY 4 HOURS PRN
Qty: 30 TABLET | Refills: 0 | Status: SHIPPED | OUTPATIENT
Start: 2019-10-16 | End: 2019-10-31 | Stop reason: ALTCHOICE

## 2019-10-16 RX ORDER — ONDANSETRON 2 MG/ML
4 INJECTION INTRAMUSCULAR; INTRAVENOUS ONCE AS NEEDED
Status: DISCONTINUED | OUTPATIENT
Start: 2019-10-16 | End: 2019-10-16

## 2019-10-16 RX ORDER — PROCHLORPERAZINE EDISYLATE 5 MG/ML
5 INJECTION INTRAMUSCULAR; INTRAVENOUS ONCE AS NEEDED
Status: DISCONTINUED | OUTPATIENT
Start: 2019-10-16 | End: 2019-10-16

## 2019-10-16 RX ORDER — ONDANSETRON 2 MG/ML
INJECTION INTRAMUSCULAR; INTRAVENOUS AS NEEDED
Status: DISCONTINUED | OUTPATIENT
Start: 2019-10-16 | End: 2019-10-16 | Stop reason: SURG

## 2019-10-16 RX ORDER — BUPIVACAINE HYDROCHLORIDE 5 MG/ML
INJECTION, SOLUTION EPIDURAL; INTRACAUDAL AS NEEDED
Status: DISCONTINUED | OUTPATIENT
Start: 2019-10-16 | End: 2019-10-16 | Stop reason: HOSPADM

## 2019-10-16 RX ORDER — LIDOCAINE HYDROCHLORIDE 10 MG/ML
INJECTION, SOLUTION EPIDURAL; INFILTRATION; INTRACAUDAL; PERINEURAL AS NEEDED
Status: DISCONTINUED | OUTPATIENT
Start: 2019-10-16 | End: 2019-10-16 | Stop reason: SURG

## 2019-10-16 RX ORDER — NALOXONE HYDROCHLORIDE 0.4 MG/ML
80 INJECTION, SOLUTION INTRAMUSCULAR; INTRAVENOUS; SUBCUTANEOUS AS NEEDED
Status: DISCONTINUED | OUTPATIENT
Start: 2019-10-16 | End: 2019-10-16

## 2019-10-16 RX ORDER — MIDAZOLAM HYDROCHLORIDE 1 MG/ML
INJECTION INTRAMUSCULAR; INTRAVENOUS AS NEEDED
Status: DISCONTINUED | OUTPATIENT
Start: 2019-10-16 | End: 2019-10-16 | Stop reason: SURG

## 2019-10-16 RX ORDER — ACETAMINOPHEN 500 MG
1000 TABLET ORAL ONCE
Status: COMPLETED | OUTPATIENT
Start: 2019-10-16 | End: 2019-10-16

## 2019-10-16 RX ORDER — HALOPERIDOL 5 MG/ML
0.25 INJECTION INTRAMUSCULAR ONCE AS NEEDED
Status: DISCONTINUED | OUTPATIENT
Start: 2019-10-16 | End: 2019-10-16

## 2019-10-16 RX ORDER — HYDROMORPHONE HYDROCHLORIDE 1 MG/ML
0.4 INJECTION, SOLUTION INTRAMUSCULAR; INTRAVENOUS; SUBCUTANEOUS EVERY 5 MIN PRN
Status: DISCONTINUED | OUTPATIENT
Start: 2019-10-16 | End: 2019-10-16

## 2019-10-16 RX ORDER — HYDROMORPHONE HYDROCHLORIDE 1 MG/ML
0.2 INJECTION, SOLUTION INTRAMUSCULAR; INTRAVENOUS; SUBCUTANEOUS EVERY 5 MIN PRN
Status: DISCONTINUED | OUTPATIENT
Start: 2019-10-16 | End: 2019-10-16

## 2019-10-16 RX ORDER — ROCURONIUM BROMIDE 10 MG/ML
INJECTION, SOLUTION INTRAVENOUS AS NEEDED
Status: DISCONTINUED | OUTPATIENT
Start: 2019-10-16 | End: 2019-10-16 | Stop reason: SURG

## 2019-10-16 RX ORDER — CEFAZOLIN SODIUM/WATER 2 G/20 ML
2 SYRINGE (ML) INTRAVENOUS ONCE
Status: COMPLETED | OUTPATIENT
Start: 2019-10-16 | End: 2019-10-16

## 2019-10-16 RX ORDER — NEOSTIGMINE METHYLSULFATE 0.5 MG/ML
INJECTION INTRAVENOUS AS NEEDED
Status: DISCONTINUED | OUTPATIENT
Start: 2019-10-16 | End: 2019-10-16 | Stop reason: SURG

## 2019-10-16 RX ORDER — HYDROCODONE BITARTRATE AND ACETAMINOPHEN 5; 325 MG/1; MG/1
1 TABLET ORAL AS NEEDED
Status: DISCONTINUED | OUTPATIENT
Start: 2019-10-16 | End: 2019-10-16

## 2019-10-16 RX ORDER — HYDROCODONE BITARTRATE AND ACETAMINOPHEN 5; 325 MG/1; MG/1
2 TABLET ORAL AS NEEDED
Status: DISCONTINUED | OUTPATIENT
Start: 2019-10-16 | End: 2019-10-16

## 2019-10-16 RX ORDER — METOCLOPRAMIDE 10 MG/1
10 TABLET ORAL ONCE
Status: COMPLETED | OUTPATIENT
Start: 2019-10-16 | End: 2019-10-16

## 2019-10-16 RX ORDER — MORPHINE SULFATE 4 MG/ML
4 INJECTION, SOLUTION INTRAMUSCULAR; INTRAVENOUS EVERY 10 MIN PRN
Status: DISCONTINUED | OUTPATIENT
Start: 2019-10-16 | End: 2019-10-16

## 2019-10-16 RX ORDER — FAMOTIDINE 20 MG/1
20 TABLET ORAL ONCE
Status: COMPLETED | OUTPATIENT
Start: 2019-10-16 | End: 2019-10-16

## 2019-10-16 RX ORDER — EPHEDRINE SULFATE 50 MG/ML
INJECTION, SOLUTION INTRAVENOUS AS NEEDED
Status: DISCONTINUED | OUTPATIENT
Start: 2019-10-16 | End: 2019-10-16 | Stop reason: SURG

## 2019-10-16 RX ORDER — ONDANSETRON 4 MG/1
4 TABLET, FILM COATED ORAL EVERY 8 HOURS PRN
Qty: 15 TABLET | Refills: 0 | Status: SHIPPED | OUTPATIENT
Start: 2019-10-16 | End: 2020-07-31 | Stop reason: ALTCHOICE

## 2019-10-16 RX ORDER — GLYCOPYRROLATE 0.2 MG/ML
INJECTION INTRAMUSCULAR; INTRAVENOUS AS NEEDED
Status: DISCONTINUED | OUTPATIENT
Start: 2019-10-16 | End: 2019-10-16 | Stop reason: SURG

## 2019-10-16 RX ORDER — POLYETHYLENE GLYCOL 3350 17 G/17G
17 POWDER, FOR SOLUTION ORAL DAILY
Qty: 14 PACKET | Refills: 0 | Status: SHIPPED | OUTPATIENT
Start: 2019-10-16 | End: 2019-10-30

## 2019-10-16 RX ADMIN — EPHEDRINE SULFATE 5 MG: 50 INJECTION, SOLUTION INTRAVENOUS at 10:07:00

## 2019-10-16 RX ADMIN — MIDAZOLAM HYDROCHLORIDE 2 MG: 1 INJECTION INTRAMUSCULAR; INTRAVENOUS at 09:34:00

## 2019-10-16 RX ADMIN — NEOSTIGMINE METHYLSULFATE 4 MG: 0.5 INJECTION INTRAVENOUS at 10:25:00

## 2019-10-16 RX ADMIN — ROCURONIUM BROMIDE 25 MG: 10 INJECTION, SOLUTION INTRAVENOUS at 09:48:00

## 2019-10-16 RX ADMIN — EPHEDRINE SULFATE 5 MG: 50 INJECTION, SOLUTION INTRAVENOUS at 10:18:00

## 2019-10-16 RX ADMIN — GLYCOPYRROLATE 0.6 MG: 0.2 INJECTION INTRAMUSCULAR; INTRAVENOUS at 10:25:00

## 2019-10-16 RX ADMIN — ONDANSETRON 4 MG: 2 INJECTION INTRAMUSCULAR; INTRAVENOUS at 10:22:00

## 2019-10-16 RX ADMIN — LIDOCAINE HYDROCHLORIDE 50 MG: 10 INJECTION, SOLUTION EPIDURAL; INFILTRATION; INTRACAUDAL; PERINEURAL at 09:39:00

## 2019-10-16 RX ADMIN — CEFAZOLIN SODIUM/WATER 2 G: 2 G/20 ML SYRINGE (ML) INTRAVENOUS at 09:45:00

## 2019-10-16 RX ADMIN — ROCURONIUM BROMIDE 5 MG: 10 INJECTION, SOLUTION INTRAVENOUS at 09:39:00

## 2019-10-16 RX ADMIN — DEXAMETHASONE SODIUM PHOSPHATE 4 MG: 4 MG/ML VIAL (ML) INJECTION at 09:47:00

## 2019-10-16 RX ADMIN — SODIUM CHLORIDE, SODIUM LACTATE, POTASSIUM CHLORIDE, CALCIUM CHLORIDE: 600; 310; 30; 20 INJECTION, SOLUTION INTRAVENOUS at 10:36:00

## 2019-10-16 NOTE — INTERVAL H&P NOTE
Pre-op Diagnosis: Umbilical hernia without obstruction or gangrene [K42.9]    The above referenced H&P was reviewed by Mikey Cordero MD on 10/16/2019, the patient was examined and no significant changes have occurred in the patient's condition since the H&P wa

## 2019-10-16 NOTE — OPERATIVE REPORT
Operative Report    Patient Name:  Jenise Sacks  MR:  [id number]  :  1965  DOS:  10/16/19    Preop Dx:    1. Disease of the appendix  2. Umbilical hernia without obstruction or gangrene [K42.9]  Postop Dx:    1. Disease of the appendix  2.  Alf Jagdish Trendelenburg position. The mesoappendix was divided using the [place] ligasure. The base of the appendix was divided using a 45 x 3.5 mm linear stapler. The appendix was delivered from the abdomen using an endocatch bag.   The abdomen was irrigated wit

## 2019-10-16 NOTE — ANESTHESIA PROCEDURE NOTES
Airway  Date/Time: 10/16/2019 9:41 AM  Urgency: elective    Airway not difficult    General Information and Staff    Patient location during procedure: OR  Anesthesiologist: Woo Pham MD  Resident/CRNA: Michelle Shane CRNA  Performed: CRNA     Sanaz

## 2019-10-16 NOTE — ANESTHESIA PREPROCEDURE EVALUATION
Anesthesia PreOp Note    HPI:     Beverly Tinsley is a 47year old male who presents for preoperative consultation requested by: Eloisa Marshall MD    Date of Surgery: 10/16/2019    Procedure(s):  LAPAROSCOPIC APPENDECTOMY  HERNIA UMBILICAL REPAIR ADULT  Indication Sleep., Disp: 10 tablet, Rfl: 0, Unknown at Unknown time      lactated ringers infusion, , Intravenous, Continuous, Bear Birch MD, Last Rate: 20 mL/hr at 10/16/19 1560  ceFAZolin sodium (ANCEF/KEFZOL) 2 GM/20ML premix IV syringe 2 g, 2 g, Intravenous, Once on file        Physically abused: Not on file        Forced sexual activity: Not on file    Other Topics      Concerns:        Not on file    Social History Narrative      Not on file      Available pre-op labs reviewed.      Lab Results   Component Value D

## 2019-10-16 NOTE — TELEPHONE ENCOUNTER
Patient returned call. (Name and  of pt verified). All results and recommendations reviewed. Patient verbalizes understanding, denies further questions and agrees with plan of care. Ordered lipid panel for 3 month interval. Ordered liver US.  Patient st

## 2019-10-16 NOTE — ANESTHESIA POSTPROCEDURE EVALUATION
Patient: Shanae Reddy    Procedure Summary     Date:  10/16/19 Room / Location:  [address] [place] [address] MAIN OR 08 / [address] [place] [address] MAIN OR    Anesthesia Start:  9897 Anesthesia Stop:  6788    Procedures:       LAPAROSCOPIC APPENDECTOMY (N/A Abdomen)      HERNIA UMBILICAL REPAIR ADULT (

## 2019-10-28 ENCOUNTER — HOSPITAL ENCOUNTER (OUTPATIENT)
Dept: ULTRASOUND IMAGING | Age: 54
Discharge: HOME | End: 2019-10-28
Attending: INTERNAL MEDICINE
Payer: COMMERCIAL

## 2019-10-28 DIAGNOSIS — K76.9 HEPATIC LESION: ICD-10-CM

## 2019-10-28 PROCEDURE — 76705 ECHO EXAM OF ABDOMEN: CPT | Performed by: INTERNAL MEDICINE

## 2019-10-31 ENCOUNTER — HOSPITAL ENCOUNTER (OUTPATIENT)
Dept: GENERAL RADIOLOGY | Facility: HOSPITAL | Age: 54
Discharge: HOME | End: 2019-10-31
Attending: INTERNAL MEDICINE
Payer: COMMERCIAL

## 2019-10-31 ENCOUNTER — OFFICE VISIT (OUTPATIENT)
Dept: SURGERY | Facility: CLINIC | Age: 54
End: 2019-10-31
Payer: COMMERCIAL

## 2019-10-31 VITALS — BODY MASS INDEX: 34 KG/M2 | WEIGHT: 248 LBS

## 2019-10-31 DIAGNOSIS — Z09 POSTOPERATIVE EXAMINATION: Primary | ICD-10-CM

## 2019-10-31 DIAGNOSIS — R05.9 COUGH: ICD-10-CM

## 2019-10-31 PROCEDURE — 99024 POSTOP FOLLOW-UP VISIT: CPT | Performed by: SURGERY

## 2019-10-31 PROCEDURE — 71046 X-RAY EXAM CHEST 2 VIEWS: CPT | Performed by: INTERNAL MEDICINE

## 2019-10-31 NOTE — PROGRESS NOTES
Patient presents with:  Post-Op: S/P Laparoscopic appendectomy 10/16/19. Patient states he is doing well. Denies fevers. Appetite is good, bowels are normal.  Incision healed.         O:  Wt 248 lb (112.5 kg)   BMI 33.63 kg/m²   GEN:  No acute distress

## 2019-12-10 ENCOUNTER — PATIENT MESSAGE (OUTPATIENT)
Dept: NEPHROLOGY | Facility: CLINIC | Age: 54
End: 2019-12-10

## 2019-12-10 NOTE — TELEPHONE ENCOUNTER
Spoke with pt on the phone and he understands MKK recommendations. [facility] message also sent to pt.

## 2019-12-10 NOTE — TELEPHONE ENCOUNTER
Amlodipine can sometimes cause swelling. Decrease amlodipine to 5 mg daily. Call in a week with blood pressure readings and report on swelling. Low-salt diet, elevate the legs.

## 2020-06-15 RX ORDER — AMLODIPINE BESYLATE 5 MG/1
TABLET ORAL
Qty: 30 TABLET | Refills: 5 | OUTPATIENT
Start: 2020-06-15

## 2020-06-15 NOTE — TELEPHONE ENCOUNTER
Patient is taking Amlodipine 10 mg not 5 mg A new prescription with new dose was sent to the pharmacy on 11/5/19.

## 2020-06-21 ENCOUNTER — LAB ENCOUNTER (OUTPATIENT)
Dept: LAB | Facility: HOSPITAL | Age: 55
End: 2020-06-21
Attending: INTERNAL MEDICINE
Payer: COMMERCIAL

## 2020-06-21 DIAGNOSIS — Z12.5 SPECIAL SCREENING FOR MALIGNANT NEOPLASM OF PROSTATE: ICD-10-CM

## 2020-06-21 DIAGNOSIS — Z00.00 BLOOD TESTS FOR ROUTINE GENERAL PHYSICAL EXAMINATION: ICD-10-CM

## 2020-06-21 DIAGNOSIS — E78.00 ELEVATED CHOLESTEROL: ICD-10-CM

## 2020-06-21 PROCEDURE — 81003 URINALYSIS AUTO W/O SCOPE: CPT

## 2020-06-21 PROCEDURE — 80053 COMPREHEN METABOLIC PANEL: CPT

## 2020-06-21 PROCEDURE — 80061 LIPID PANEL: CPT

## 2020-06-21 PROCEDURE — 36415 COLL VENOUS BLD VENIPUNCTURE: CPT

## 2020-06-21 PROCEDURE — 85025 COMPLETE CBC W/AUTO DIFF WBC: CPT

## 2020-07-14 DIAGNOSIS — I10 ESSENTIAL HYPERTENSION: ICD-10-CM

## 2020-07-14 NOTE — TELEPHONE ENCOUNTER
Current Outpatient Medications   Medication Sig Dispense Refill   • amLODIPine Besylate 5 MG Oral Tab Take 1 tablet (5 mg total) by mouth daily. 30 tablet 0     • lisinopril 5 MG Oral Tab Take 1 tablet (5 mg total) by mouth daily.  30 tablet 0

## 2020-07-14 NOTE — TELEPHONE ENCOUNTER
Last seen 9/24/19. Return to clinic in 6 mos (3/2020) No follow up scheduled. Refill(s) pended and routed to Dr. Polo Martinez.

## 2020-07-15 RX ORDER — AMLODIPINE BESYLATE 5 MG/1
5 TABLET ORAL DAILY
Qty: 30 TABLET | Refills: 0 | Status: SHIPPED | OUTPATIENT
Start: 2020-07-15 | End: 2020-08-19

## 2020-07-15 RX ORDER — LISINOPRIL 5 MG/1
5 TABLET ORAL DAILY
Qty: 30 TABLET | Refills: 0 | Status: SHIPPED | OUTPATIENT
Start: 2020-07-15 | End: 2020-08-19

## 2020-07-30 ENCOUNTER — OFFICE VISIT (OUTPATIENT)
Dept: SURGERY | Facility: CLINIC | Age: 55
End: 2020-07-30
Payer: COMMERCIAL

## 2020-07-30 VITALS
WEIGHT: 248 LBS | HEIGHT: 72 IN | HEART RATE: 70 BPM | SYSTOLIC BLOOD PRESSURE: 120 MMHG | BODY MASS INDEX: 33.59 KG/M2 | DIASTOLIC BLOOD PRESSURE: 74 MMHG

## 2020-07-30 DIAGNOSIS — M62.08 RECTUS DIASTASIS: ICD-10-CM

## 2020-07-30 DIAGNOSIS — K43.9 VENTRAL HERNIA WITHOUT OBSTRUCTION OR GANGRENE: Primary | ICD-10-CM

## 2020-07-30 PROCEDURE — 99214 OFFICE O/P EST MOD 30 MIN: CPT | Performed by: SURGERY

## 2020-07-30 PROCEDURE — 3074F SYST BP LT 130 MM HG: CPT | Performed by: SURGERY

## 2020-07-30 PROCEDURE — 3078F DIAST BP <80 MM HG: CPT | Performed by: SURGERY

## 2020-07-30 PROCEDURE — 3008F BODY MASS INDEX DOCD: CPT | Performed by: SURGERY

## 2020-07-31 ENCOUNTER — OFFICE VISIT (OUTPATIENT)
Dept: NEPHROLOGY | Facility: CLINIC | Age: 55
End: 2020-07-31
Payer: COMMERCIAL

## 2020-07-31 VITALS
SYSTOLIC BLOOD PRESSURE: 113 MMHG | WEIGHT: 250.81 LBS | HEART RATE: 76 BPM | DIASTOLIC BLOOD PRESSURE: 72 MMHG | TEMPERATURE: 76 F | HEIGHT: 72 IN | BODY MASS INDEX: 33.97 KG/M2

## 2020-07-31 DIAGNOSIS — E78.00 PURE HYPERCHOLESTEROLEMIA: ICD-10-CM

## 2020-07-31 DIAGNOSIS — G47.30 SLEEP APNEA, UNSPECIFIED TYPE: Primary | ICD-10-CM

## 2020-07-31 DIAGNOSIS — I10 ESSENTIAL HYPERTENSION: ICD-10-CM

## 2020-07-31 PROCEDURE — 3008F BODY MASS INDEX DOCD: CPT | Performed by: INTERNAL MEDICINE

## 2020-07-31 PROCEDURE — 3078F DIAST BP <80 MM HG: CPT | Performed by: INTERNAL MEDICINE

## 2020-07-31 PROCEDURE — 3074F SYST BP LT 130 MM HG: CPT | Performed by: INTERNAL MEDICINE

## 2020-07-31 PROCEDURE — 99215 OFFICE O/P EST HI 40 MIN: CPT | Performed by: INTERNAL MEDICINE

## 2020-07-31 RX ORDER — ROSUVASTATIN CALCIUM 5 MG/1
5 TABLET, COATED ORAL NIGHTLY
Qty: 30 TABLET | Refills: 5 | Status: SHIPPED | OUTPATIENT
Start: 2020-07-31 | End: 2020-10-16

## 2020-07-31 RX ORDER — ZOLPIDEM TARTRATE 10 MG/1
TABLET ORAL NIGHTLY PRN
Qty: 10 TABLET | Refills: 0 | Status: SHIPPED | OUTPATIENT
Start: 2020-07-31 | End: 2020-10-28 | Stop reason: ALTCHOICE

## 2020-08-01 PROBLEM — K43.9 VENTRAL HERNIA WITHOUT OBSTRUCTION OR GANGRENE: Status: ACTIVE | Noted: 2019-06-26

## 2020-08-01 NOTE — PATIENT INSTRUCTIONS
Please do the sleep study as ordered. See the ear nose and throat doctor. Start Crestor for your cholesterol. Repeat laboratory studies in 1 month. Continue to watch diet and exercise.   Let me know if that throbbing sensation in your neck should contin

## 2020-08-01 NOTE — H&P
HPI:    Patient ID: Lisy Oneal is a 49year old male presenting with Patient presents with:  Hernia: Patient here for follow up and consult. Patient reports possible recurrent umbilical hernia.   Patient states he had umbilical hernia repair about a year Sexual activity: Not on file    Lifestyle      Physical activity:        Days per week: Not on file        Minutes per session: Not on file      Stress: Not on file    Relationships      Social connections:        Talks on phone: Not on file        Get Constitutional: He is oriented to person, place, and time. Vital signs are normal. He appears well-developed and well-nourished. HENT:   Head: Normocephalic and atraumatic. Neck: Normal range of motion. Neck supple.    Cardiovascular: Normal rate, regul

## 2020-08-01 NOTE — PROGRESS NOTES
[facility]  Nephrology Daily Progress Note    Manpreet Carranza  [id number]  49year old  Patient presents with: Annual      HPI:   Manpreet Carranza is a 49year old male.   52-year-old male with a history of hypercholesterolemia, migraine headaches, chronic roman 7/31/2020   /74 - 113/72   BP Location - - -   Patient Position - - -   Cuff Size - - -   Pulse 70 - 76   Resp - - -   Temp - - 76   SpO2 - - -   Weight 248 lbs - 250 lbs 13 oz   Height 72 - 72   BODY MASS INDEX - 34.01 -       VITALS: WEIGHT ONLY 10 by mouth nightly as needed for Sleep., Disp: 10 tablet, Rfl: 0  •  Rosuvastatin Calcium (CRESTOR) 5 MG Oral Tab, Take 1 tablet (5 mg total) by mouth nightly., Disp: 30 tablet, Rfl: 5  •  amLODIPine Besylate 5 MG Oral Tab, Take 1 tablet (5 mg total) by mout

## 2020-08-14 ENCOUNTER — OFFICE VISIT (OUTPATIENT)
Dept: OTOLARYNGOLOGY | Facility: CLINIC | Age: 55
End: 2020-08-14
Payer: COMMERCIAL

## 2020-08-14 ENCOUNTER — OFFICE VISIT (OUTPATIENT)
Dept: AUDIOLOGY | Facility: CLINIC | Age: 55
End: 2020-08-14
Payer: COMMERCIAL

## 2020-08-14 VITALS
BODY MASS INDEX: 33.86 KG/M2 | DIASTOLIC BLOOD PRESSURE: 83 MMHG | HEIGHT: 72 IN | WEIGHT: 250 LBS | SYSTOLIC BLOOD PRESSURE: 135 MMHG | TEMPERATURE: 98 F | HEART RATE: 98 BPM

## 2020-08-14 DIAGNOSIS — H69.83 DYSFUNCTION OF BOTH EUSTACHIAN TUBES: ICD-10-CM

## 2020-08-14 DIAGNOSIS — H90.0 CONDUCTIVE HEARING LOSS, BILATERAL: Primary | ICD-10-CM

## 2020-08-14 DIAGNOSIS — H93.13 TINNITUS OF BOTH EARS: Primary | ICD-10-CM

## 2020-08-14 PROCEDURE — 3079F DIAST BP 80-89 MM HG: CPT | Performed by: OTOLARYNGOLOGY

## 2020-08-14 PROCEDURE — 3075F SYST BP GE 130 - 139MM HG: CPT | Performed by: OTOLARYNGOLOGY

## 2020-08-14 PROCEDURE — 3008F BODY MASS INDEX DOCD: CPT | Performed by: OTOLARYNGOLOGY

## 2020-08-14 PROCEDURE — 92567 TYMPANOMETRY: CPT | Performed by: AUDIOLOGIST

## 2020-08-14 PROCEDURE — 99243 OFF/OP CNSLTJ NEW/EST LOW 30: CPT | Performed by: OTOLARYNGOLOGY

## 2020-08-14 PROCEDURE — 92557 COMPREHENSIVE HEARING TEST: CPT | Performed by: AUDIOLOGIST

## 2020-08-14 RX ORDER — FLUTICASONE PROPIONATE 50 MCG
2 SPRAY, SUSPENSION (ML) NASAL DAILY
Qty: 1 BOTTLE | Refills: 3 | Status: SHIPPED | OUTPATIENT
Start: 2020-08-14 | End: 2020-10-28 | Stop reason: ALTCHOICE

## 2020-08-14 NOTE — PROGRESS NOTES
AUDIOLOGY REPORT      Asael Ziegler is a 49year old male     Referring Provider: Annetta Waite   YOB: 1965  Medical Record: [id number]      Patient Hearing History:  Patient reported tinnitus bilaterally.        Otoscopic Inspection:  Right e

## 2020-08-14 NOTE — PROGRESS NOTES
Khoi Centeno is a 49year old male. Patient presents with:  Ear Problem: pt presents with Ringing in both ears, some hearing loss, per pt feeling unbalanced     HPI:   He has had problems with ringing in his ears intermittently over the last few weeks.   He (1.829 m)   Wt 250 lb (113.4 kg)   BMI 33.91 kg/m²   System Findings Details   Skin Normal Inspection - Normal.   Constitutional Normal Overall appearance - Normal.   Head/Face Normal Facial features - Normal. Eyebrows - Normal. Skull - Normal.   Oral/Orop

## 2020-08-18 DIAGNOSIS — I10 ESSENTIAL HYPERTENSION: ICD-10-CM

## 2020-08-19 RX ORDER — AMLODIPINE BESYLATE 5 MG/1
TABLET ORAL
Qty: 30 TABLET | Refills: 5 | Status: SHIPPED | OUTPATIENT
Start: 2020-08-19 | End: 2021-01-29

## 2020-08-19 RX ORDER — LISINOPRIL 5 MG/1
TABLET ORAL
Qty: 30 TABLET | Refills: 5 | Status: SHIPPED | OUTPATIENT
Start: 2020-08-19 | End: 2021-01-29

## 2020-08-19 NOTE — TELEPHONE ENCOUNTER
Last seen 7/31/2020. Return to clinic in 6 mos (1/2021) Refill(s) pended and routed to Dr. Ludin De La Vega.

## 2020-09-13 ENCOUNTER — TELEPHONE (OUTPATIENT)
Dept: NEPHROLOGY | Facility: CLINIC | Age: 55
End: 2020-09-13

## 2020-09-13 ENCOUNTER — LAB ENCOUNTER (OUTPATIENT)
Dept: LAB | Facility: HOSPITAL | Age: 55
End: 2020-09-13
Attending: INTERNAL MEDICINE
Payer: COMMERCIAL

## 2020-09-13 DIAGNOSIS — E78.00 HYPERCHOLESTEREMIA: Primary | ICD-10-CM

## 2020-09-13 DIAGNOSIS — E78.00 PURE HYPERCHOLESTEROLEMIA: ICD-10-CM

## 2020-09-13 LAB
ALBUMIN SERPL-MCNC: 3.7 G/DL (ref 3.4–5)
ALP LIVER SERPL-CCNC: 52 U/L (ref 45–117)
ALT SERPL-CCNC: 36 U/L (ref 16–61)
AST SERPL-CCNC: 18 U/L (ref 15–37)
BILIRUB DIRECT SERPL-MCNC: 0.2 MG/DL (ref 0–0.2)
BILIRUB SERPL-MCNC: 0.6 MG/DL (ref 0.1–2)
CHOLEST SMN-MCNC: 169 MG/DL (ref ?–200)
HDLC SERPL-MCNC: 37 MG/DL (ref 40–59)
LDLC SERPL CALC-MCNC: 102 MG/DL (ref ?–100)
M PROTEIN MFR SERPL ELPH: 7.4 G/DL (ref 6.4–8.2)
NONHDLC SERPL-MCNC: 132 MG/DL (ref ?–130)
PATIENT FASTING Y/N/NP: YES
TRIGL SERPL-MCNC: 149 MG/DL (ref 30–149)
VLDLC SERPL CALC-MCNC: 30 MG/DL (ref 0–30)

## 2020-09-13 PROCEDURE — 80061 LIPID PANEL: CPT

## 2020-09-13 PROCEDURE — 36415 COLL VENOUS BLD VENIPUNCTURE: CPT

## 2020-09-13 PROCEDURE — 80076 HEPATIC FUNCTION PANEL: CPT

## 2020-09-13 NOTE — TELEPHONE ENCOUNTER
Chol much better but bad chol still a lttle high. If no side effects increase Crestor to 10 mg qd. Watch diet and exercise. Liver and lipid panel in 6 wks.

## 2020-09-22 ENCOUNTER — PATIENT MESSAGE (OUTPATIENT)
Dept: NEPHROLOGY | Facility: CLINIC | Age: 55
End: 2020-09-22

## 2020-09-22 RX ORDER — ROSUVASTATIN CALCIUM 10 MG/1
10 TABLET, COATED ORAL NIGHTLY
Qty: 90 TABLET | Refills: 0 | Status: SHIPPED | OUTPATIENT
Start: 2020-09-22 | End: 2020-12-18

## 2020-09-22 NOTE — TELEPHONE ENCOUNTER
From: Marie Chapman  To: Halle Solorio MD  Sent: 9/22/2020 4:16 PM CDT  Subject: Prescription Question    Can I have an updated prescription sent to my pharmacy for Rosuvastatin?  My current dosage is 5mg/day, but Dr. eMlida Esposito would like me to increase it to

## 2020-09-22 NOTE — TELEPHONE ENCOUNTER
LOV 7/31/20  No F/U scheduled  Last lipid panel 9/13/20  Patient was advised to increase crestor to 10 mg daily at that time. Order pending for new dose.

## 2020-10-07 ENCOUNTER — TELEPHONE (OUTPATIENT)
Dept: SURGERY | Facility: CLINIC | Age: 55
End: 2020-10-07

## 2020-10-07 NOTE — TELEPHONE ENCOUNTER
Forms dept received fmla. Logged for processing. Sent [employer] message for missing hipaa.

## 2020-10-07 NOTE — TELEPHONE ENCOUNTER
Paper work ws fax'ed to General Surgery to be completed.  Sent to Forms and placed in JOSEPH @ [address]

## 2020-10-15 NOTE — TELEPHONE ENCOUNTER
Dr. Tan Roll,     Please sign off on form: Fmla 6-8 wks post op  -Highlight the patient and hit \"Chart\" button.   -In Chart Review, w/in the Encounter tab - click 1 time on the Telephone call encounter for 10/7/2020 Scroll down the telephone encounter.  -Click

## 2020-10-16 ENCOUNTER — TELEPHONE (OUTPATIENT)
Dept: SURGERY | Facility: CLINIC | Age: 55
End: 2020-10-16

## 2020-10-17 ENCOUNTER — APPOINTMENT (OUTPATIENT)
Dept: LAB | Facility: HOSPITAL | Age: 55
End: 2020-10-17
Attending: SURGERY
Payer: COMMERCIAL

## 2020-10-17 ENCOUNTER — LAB ENCOUNTER (OUTPATIENT)
Dept: LAB | Facility: HOSPITAL | Age: 55
End: 2020-10-17
Attending: SURGERY
Payer: COMMERCIAL

## 2020-10-17 DIAGNOSIS — Z01.818 PREOP TESTING: ICD-10-CM

## 2020-10-17 DIAGNOSIS — E78.00 HYPERCHOLESTEREMIA: ICD-10-CM

## 2020-10-17 PROCEDURE — 36415 COLL VENOUS BLD VENIPUNCTURE: CPT

## 2020-10-17 PROCEDURE — 80061 LIPID PANEL: CPT

## 2020-10-17 PROCEDURE — 80076 HEPATIC FUNCTION PANEL: CPT

## 2020-10-19 ENCOUNTER — ANESTHESIA (OUTPATIENT)
Dept: SURGERY | Facility: HOSPITAL | Age: 55
DRG: 355 | End: 2020-10-19
Payer: COMMERCIAL

## 2020-10-19 ENCOUNTER — ANESTHESIA EVENT (OUTPATIENT)
Dept: SURGERY | Facility: HOSPITAL | Age: 55
DRG: 355 | End: 2020-10-19
Payer: COMMERCIAL

## 2020-10-19 ENCOUNTER — HOSPITAL ENCOUNTER (INPATIENT)
Facility: HOSPITAL | Age: 55
LOS: 1 days | Discharge: HOME | DRG: 355 | End: 2020-10-20
Attending: SURGERY | Admitting: SURGERY
Payer: COMMERCIAL

## 2020-10-19 DIAGNOSIS — Z01.818 PREOP TESTING: Primary | ICD-10-CM

## 2020-10-19 DIAGNOSIS — K43.9 VENTRAL HERNIA WITHOUT OBSTRUCTION OR GANGRENE: ICD-10-CM

## 2020-10-19 DIAGNOSIS — M62.08 DIASTASIS RECTI: ICD-10-CM

## 2020-10-19 PROBLEM — E78.5 HYPERLIPIDEMIA: Chronic | Status: ACTIVE | Noted: 2020-10-19

## 2020-10-19 PROBLEM — I10 ESSENTIAL HYPERTENSION: Chronic | Status: ACTIVE | Noted: 2020-10-19

## 2020-10-19 PROCEDURE — 0WUF0JZ SUPPLEMENT ABDOMINAL WALL WITH SYNTHETIC SUBSTITUTE, OPEN APPROACH: ICD-10-PCS | Performed by: SURGERY

## 2020-10-19 PROCEDURE — 15734 MUSCLE-SKIN GRAFT TRUNK: CPT | Performed by: SURGERY

## 2020-10-19 PROCEDURE — 49568 REPAIR INCIS HERNIA W MESH: CPT | Performed by: SURGERY

## 2020-10-19 PROCEDURE — 99232 SBSQ HOSP IP/OBS MODERATE 35: CPT | Performed by: HOSPITALIST

## 2020-10-19 PROCEDURE — 49560 REPAIR INCISIONAL HERNIA,REDUCIBLE: CPT | Performed by: SURGERY

## 2020-10-19 PROCEDURE — 3E0T3BZ INTRODUCTION OF ANESTHETIC AGENT INTO PERIPHERAL NERVES AND PLEXI, PERCUTANEOUS APPROACH: ICD-10-PCS | Performed by: SURGERY

## 2020-10-19 DEVICE — BARD MESH
Type: IMPLANTABLE DEVICE | Site: ABDOMEN | Status: FUNCTIONAL
Brand: BARD MESH

## 2020-10-19 RX ORDER — HYDROMORPHONE HYDROCHLORIDE 1 MG/ML
0.2 INJECTION, SOLUTION INTRAMUSCULAR; INTRAVENOUS; SUBCUTANEOUS EVERY 5 MIN PRN
Status: DISCONTINUED | OUTPATIENT
Start: 2020-10-19 | End: 2020-10-19 | Stop reason: HOSPADM

## 2020-10-19 RX ORDER — ACETAMINOPHEN 500 MG
1000 TABLET ORAL ONCE
Status: COMPLETED | OUTPATIENT
Start: 2020-10-19 | End: 2020-10-19

## 2020-10-19 RX ORDER — AMLODIPINE BESYLATE 5 MG/1
5 TABLET ORAL DAILY
Status: DISCONTINUED | OUTPATIENT
Start: 2020-10-20 | End: 2020-10-20

## 2020-10-19 RX ORDER — HYDROCODONE BITARTRATE AND ACETAMINOPHEN 5; 325 MG/1; MG/1
1 TABLET ORAL EVERY 4 HOURS PRN
Status: DISCONTINUED | OUTPATIENT
Start: 2020-10-19 | End: 2020-10-20

## 2020-10-19 RX ORDER — METOCLOPRAMIDE 10 MG/1
10 TABLET ORAL ONCE
Status: COMPLETED | OUTPATIENT
Start: 2020-10-19 | End: 2020-10-19

## 2020-10-19 RX ORDER — ZOLPIDEM TARTRATE 5 MG/1
5 TABLET ORAL NIGHTLY PRN
Status: DISCONTINUED | OUTPATIENT
Start: 2020-10-19 | End: 2020-10-20

## 2020-10-19 RX ORDER — POLYETHYLENE GLYCOL 3350 17 G/17G
17 POWDER, FOR SOLUTION ORAL DAILY PRN
Status: DISCONTINUED | OUTPATIENT
Start: 2020-10-19 | End: 2020-10-20

## 2020-10-19 RX ORDER — MORPHINE SULFATE 4 MG/ML
4 INJECTION, SOLUTION INTRAMUSCULAR; INTRAVENOUS EVERY 10 MIN PRN
Status: DISCONTINUED | OUTPATIENT
Start: 2020-10-19 | End: 2020-10-19 | Stop reason: HOSPADM

## 2020-10-19 RX ORDER — KETOROLAC TROMETHAMINE 30 MG/ML
30 INJECTION, SOLUTION INTRAMUSCULAR; INTRAVENOUS EVERY 6 HOURS
Status: DISCONTINUED | OUTPATIENT
Start: 2020-10-19 | End: 2020-10-19

## 2020-10-19 RX ORDER — LISINOPRIL 5 MG/1
5 TABLET ORAL DAILY
Status: DISCONTINUED | OUTPATIENT
Start: 2020-10-20 | End: 2020-10-20

## 2020-10-19 RX ORDER — HALOPERIDOL 5 MG/ML
0.25 INJECTION INTRAMUSCULAR ONCE AS NEEDED
Status: DISCONTINUED | OUTPATIENT
Start: 2020-10-19 | End: 2020-10-19 | Stop reason: HOSPADM

## 2020-10-19 RX ORDER — CETIRIZINE HYDROCHLORIDE 10 MG/1
10 TABLET ORAL DAILY
Status: DISCONTINUED | OUTPATIENT
Start: 2020-10-20 | End: 2020-10-20

## 2020-10-19 RX ORDER — METOCLOPRAMIDE HYDROCHLORIDE 5 MG/ML
10 INJECTION INTRAMUSCULAR; INTRAVENOUS EVERY 6 HOURS PRN
Status: DISCONTINUED | OUTPATIENT
Start: 2020-10-19 | End: 2020-10-20

## 2020-10-19 RX ORDER — ONDANSETRON 2 MG/ML
4 INJECTION INTRAMUSCULAR; INTRAVENOUS ONCE AS NEEDED
Status: DISCONTINUED | OUTPATIENT
Start: 2020-10-19 | End: 2020-10-19 | Stop reason: HOSPADM

## 2020-10-19 RX ORDER — HYDROMORPHONE HYDROCHLORIDE 1 MG/ML
0.4 INJECTION, SOLUTION INTRAMUSCULAR; INTRAVENOUS; SUBCUTANEOUS EVERY 5 MIN PRN
Status: DISCONTINUED | OUTPATIENT
Start: 2020-10-19 | End: 2020-10-19 | Stop reason: HOSPADM

## 2020-10-19 RX ORDER — HYDROMORPHONE HYDROCHLORIDE 1 MG/ML
0.6 INJECTION, SOLUTION INTRAMUSCULAR; INTRAVENOUS; SUBCUTANEOUS EVERY 5 MIN PRN
Status: DISCONTINUED | OUTPATIENT
Start: 2020-10-19 | End: 2020-10-19 | Stop reason: HOSPADM

## 2020-10-19 RX ORDER — DEXAMETHASONE SODIUM PHOSPHATE 4 MG/ML
VIAL (ML) INJECTION AS NEEDED
Status: DISCONTINUED | OUTPATIENT
Start: 2020-10-19 | End: 2020-10-19 | Stop reason: SURG

## 2020-10-19 RX ORDER — MORPHINE SULFATE 4 MG/ML
4 INJECTION, SOLUTION INTRAMUSCULAR; INTRAVENOUS EVERY 2 HOUR PRN
Status: DISCONTINUED | OUTPATIENT
Start: 2020-10-19 | End: 2020-10-20

## 2020-10-19 RX ORDER — HYDROCODONE BITARTRATE AND ACETAMINOPHEN 5; 325 MG/1; MG/1
2 TABLET ORAL AS NEEDED
Status: DISCONTINUED | OUTPATIENT
Start: 2020-10-19 | End: 2020-10-19 | Stop reason: HOSPADM

## 2020-10-19 RX ORDER — CETIRIZINE HYDROCHLORIDE 10 MG/1
10 TABLET ORAL DAILY
COMMUNITY
End: 2021-06-28 | Stop reason: ALTCHOICE

## 2020-10-19 RX ORDER — BISACODYL 10 MG
10 SUPPOSITORY, RECTAL RECTAL
Status: DISCONTINUED | OUTPATIENT
Start: 2020-10-19 | End: 2020-10-20

## 2020-10-19 RX ORDER — FAMOTIDINE 20 MG/1
20 TABLET ORAL ONCE
Status: COMPLETED | OUTPATIENT
Start: 2020-10-19 | End: 2020-10-19

## 2020-10-19 RX ORDER — SODIUM CHLORIDE, SODIUM LACTATE, POTASSIUM CHLORIDE, CALCIUM CHLORIDE 600; 310; 30; 20 MG/100ML; MG/100ML; MG/100ML; MG/100ML
INJECTION, SOLUTION INTRAVENOUS CONTINUOUS
Status: DISCONTINUED | OUTPATIENT
Start: 2020-10-19 | End: 2020-10-19 | Stop reason: HOSPADM

## 2020-10-19 RX ORDER — FLUTICASONE PROPIONATE 50 MCG
2 SPRAY, SUSPENSION (ML) NASAL DAILY
Status: DISCONTINUED | OUTPATIENT
Start: 2020-10-19 | End: 2020-10-20

## 2020-10-19 RX ORDER — HYDROCODONE BITARTRATE AND ACETAMINOPHEN 5; 325 MG/1; MG/1
2 TABLET ORAL EVERY 4 HOURS PRN
Status: DISCONTINUED | OUTPATIENT
Start: 2020-10-19 | End: 2020-10-20

## 2020-10-19 RX ORDER — SODIUM PHOSPHATE, DIBASIC AND SODIUM PHOSPHATE, MONOBASIC 7; 19 G/133ML; G/133ML
1 ENEMA RECTAL ONCE AS NEEDED
Status: DISCONTINUED | OUTPATIENT
Start: 2020-10-19 | End: 2020-10-20

## 2020-10-19 RX ORDER — ROSUVASTATIN CALCIUM 10 MG/1
10 TABLET, COATED ORAL NIGHTLY
Status: DISCONTINUED | OUTPATIENT
Start: 2020-10-19 | End: 2020-10-20

## 2020-10-19 RX ORDER — PROCHLORPERAZINE EDISYLATE 5 MG/ML
5 INJECTION INTRAMUSCULAR; INTRAVENOUS ONCE AS NEEDED
Status: DISCONTINUED | OUTPATIENT
Start: 2020-10-19 | End: 2020-10-19 | Stop reason: HOSPADM

## 2020-10-19 RX ORDER — GLYCOPYRROLATE 0.2 MG/ML
INJECTION, SOLUTION INTRAMUSCULAR; INTRAVENOUS AS NEEDED
Status: DISCONTINUED | OUTPATIENT
Start: 2020-10-19 | End: 2020-10-19 | Stop reason: SURG

## 2020-10-19 RX ORDER — ONDANSETRON 2 MG/ML
4 INJECTION INTRAMUSCULAR; INTRAVENOUS EVERY 6 HOURS PRN
Status: DISCONTINUED | OUTPATIENT
Start: 2020-10-19 | End: 2020-10-20

## 2020-10-19 RX ORDER — KETOROLAC TROMETHAMINE 30 MG/ML
30 INJECTION, SOLUTION INTRAMUSCULAR; INTRAVENOUS EVERY 6 HOURS
Status: DISCONTINUED | OUTPATIENT
Start: 2020-10-19 | End: 2020-10-20

## 2020-10-19 RX ORDER — EPHEDRINE SULFATE 50 MG/ML
INJECTION, SOLUTION INTRAVENOUS AS NEEDED
Status: DISCONTINUED | OUTPATIENT
Start: 2020-10-19 | End: 2020-10-19 | Stop reason: SURG

## 2020-10-19 RX ORDER — MORPHINE SULFATE 10 MG/ML
6 INJECTION, SOLUTION INTRAMUSCULAR; INTRAVENOUS EVERY 10 MIN PRN
Status: DISCONTINUED | OUTPATIENT
Start: 2020-10-19 | End: 2020-10-19 | Stop reason: HOSPADM

## 2020-10-19 RX ORDER — LIDOCAINE HYDROCHLORIDE 10 MG/ML
INJECTION, SOLUTION EPIDURAL; INFILTRATION; INTRACAUDAL; PERINEURAL AS NEEDED
Status: DISCONTINUED | OUTPATIENT
Start: 2020-10-19 | End: 2020-10-19 | Stop reason: SURG

## 2020-10-19 RX ORDER — SODIUM CHLORIDE, SODIUM LACTATE, POTASSIUM CHLORIDE, CALCIUM CHLORIDE 600; 310; 30; 20 MG/100ML; MG/100ML; MG/100ML; MG/100ML
INJECTION, SOLUTION INTRAVENOUS CONTINUOUS
Status: DISCONTINUED | OUTPATIENT
Start: 2020-10-19 | End: 2020-10-20

## 2020-10-19 RX ORDER — MORPHINE SULFATE 4 MG/ML
2 INJECTION, SOLUTION INTRAMUSCULAR; INTRAVENOUS EVERY 10 MIN PRN
Status: DISCONTINUED | OUTPATIENT
Start: 2020-10-19 | End: 2020-10-19 | Stop reason: HOSPADM

## 2020-10-19 RX ORDER — NALOXONE HYDROCHLORIDE 0.4 MG/ML
80 INJECTION, SOLUTION INTRAMUSCULAR; INTRAVENOUS; SUBCUTANEOUS AS NEEDED
Status: DISCONTINUED | OUTPATIENT
Start: 2020-10-19 | End: 2020-10-19 | Stop reason: HOSPADM

## 2020-10-19 RX ORDER — NEOSTIGMINE METHYLSULFATE 1 MG/ML
INJECTION INTRAVENOUS AS NEEDED
Status: DISCONTINUED | OUTPATIENT
Start: 2020-10-19 | End: 2020-10-19 | Stop reason: SURG

## 2020-10-19 RX ORDER — ONDANSETRON 2 MG/ML
INJECTION INTRAMUSCULAR; INTRAVENOUS AS NEEDED
Status: DISCONTINUED | OUTPATIENT
Start: 2020-10-19 | End: 2020-10-19 | Stop reason: SURG

## 2020-10-19 RX ORDER — BUPIVACAINE HYDROCHLORIDE 5 MG/ML
INJECTION, SOLUTION EPIDURAL; INTRACAUDAL AS NEEDED
Status: DISCONTINUED | OUTPATIENT
Start: 2020-10-19 | End: 2020-10-19 | Stop reason: HOSPADM

## 2020-10-19 RX ORDER — CEFAZOLIN SODIUM/WATER 2 G/20 ML
2 SYRINGE (ML) INTRAVENOUS ONCE
Status: COMPLETED | OUTPATIENT
Start: 2020-10-19 | End: 2020-10-19

## 2020-10-19 RX ORDER — MORPHINE SULFATE 2 MG/ML
2 INJECTION, SOLUTION INTRAMUSCULAR; INTRAVENOUS EVERY 2 HOUR PRN
Status: DISCONTINUED | OUTPATIENT
Start: 2020-10-19 | End: 2020-10-20

## 2020-10-19 RX ORDER — MORPHINE SULFATE 4 MG/ML
8 INJECTION, SOLUTION INTRAMUSCULAR; INTRAVENOUS EVERY 2 HOUR PRN
Status: DISCONTINUED | OUTPATIENT
Start: 2020-10-19 | End: 2020-10-20

## 2020-10-19 RX ORDER — DOCUSATE SODIUM 100 MG/1
100 CAPSULE, LIQUID FILLED ORAL 2 TIMES DAILY
Status: DISCONTINUED | OUTPATIENT
Start: 2020-10-19 | End: 2020-10-20

## 2020-10-19 RX ORDER — HYDROCODONE BITARTRATE AND ACETAMINOPHEN 5; 325 MG/1; MG/1
1 TABLET ORAL AS NEEDED
Status: DISCONTINUED | OUTPATIENT
Start: 2020-10-19 | End: 2020-10-19 | Stop reason: HOSPADM

## 2020-10-19 RX ORDER — ROCURONIUM BROMIDE 10 MG/ML
INJECTION, SOLUTION INTRAVENOUS AS NEEDED
Status: DISCONTINUED | OUTPATIENT
Start: 2020-10-19 | End: 2020-10-19 | Stop reason: SURG

## 2020-10-19 RX ADMIN — ROCURONIUM BROMIDE 20 MG: 10 INJECTION, SOLUTION INTRAVENOUS at 12:32:00

## 2020-10-19 RX ADMIN — SODIUM CHLORIDE, SODIUM LACTATE, POTASSIUM CHLORIDE, CALCIUM CHLORIDE: 600; 310; 30; 20 INJECTION, SOLUTION INTRAVENOUS at 11:33:00

## 2020-10-19 RX ADMIN — DEXAMETHASONE SODIUM PHOSPHATE 4 MG: 4 MG/ML VIAL (ML) INJECTION at 11:33:00

## 2020-10-19 RX ADMIN — NEOSTIGMINE METHYLSULFATE 5 MG: 1 INJECTION INTRAVENOUS at 13:27:00

## 2020-10-19 RX ADMIN — GLYCOPYRROLATE 1 MG: 0.2 INJECTION, SOLUTION INTRAMUSCULAR; INTRAVENOUS at 13:27:00

## 2020-10-19 RX ADMIN — EPHEDRINE SULFATE 10 MG: 50 INJECTION, SOLUTION INTRAVENOUS at 12:14:00

## 2020-10-19 RX ADMIN — ROCURONIUM BROMIDE 50 MG: 10 INJECTION, SOLUTION INTRAVENOUS at 11:33:00

## 2020-10-19 RX ADMIN — LIDOCAINE HYDROCHLORIDE 50 MG: 10 INJECTION, SOLUTION EPIDURAL; INFILTRATION; INTRACAUDAL; PERINEURAL at 11:33:00

## 2020-10-19 RX ADMIN — CEFAZOLIN SODIUM/WATER 2 G: 2 G/20 ML SYRINGE (ML) INTRAVENOUS at 11:33:00

## 2020-10-19 RX ADMIN — ONDANSETRON 4 MG: 2 INJECTION INTRAMUSCULAR; INTRAVENOUS at 11:33:00

## 2020-10-19 NOTE — ANESTHESIA PREPROCEDURE EVALUATION
Anesthesia PreOp Note    HPI:     Khoi Centeno is a 49year old male who presents for preoperative consultation requested by: Bertrand Arias MD    Date of Surgery: 10/19/2020    Procedure(s):   HERNIA VENTRAL REPAIR  Indication: Ventral hernia without obstructio tablet, Rfl: 0, 10/18/2020 at 2100    •  AMLODIPINE BESYLATE 5 MG Oral Tab, TAKE 1 TABLET BY MOUTH EVERY DAY, Disp: 30 tablet, Rfl: 5, 10/19/2020 at 800    •  LISINOPRIL 5 MG Oral Tab, TAKE 1 TABLET BY MOUTH EVERY DAY, Disp: 30 tablet, Rfl: 5, 10/17/2020 a Never Used    Substance and Sexual Activity      Alcohol use: Yes        Frequency: Monthly or less        Drinks per session: 1 or 2        Binge frequency: Never        Comment: social once monthly      Drug use: No      Sexual activity: Not on file    L major complications, and any alternative forms of anesthetic management. All of the patient's questions were answered to the best of my ability. The patient desires the anesthetic management as planned.   Nadya Gresham  10/19/2020 10:53 AM

## 2020-10-19 NOTE — OPERATIVE REPORT
Operative Report    Patient Name:  Checo Conn  MR:  [id number]  :  1965  DOS:  10/19/20    Preop Dx: Ventral hernia without obstruction or gangrene [K43.9]  Diastasis recti [M62.08]  Postop Dx:   Ventral hernia without obstruction or gangrene [K4 edge debrided. The fascia defect measured approximately 5 by 3 cm. There was moderate rectus diastasis surrounding the hernia and I made the decision to fix the hernia via the retrorectus technique.   We incised the posterior rectus fascia just lateral to

## 2020-10-19 NOTE — ANESTHESIA PROCEDURE NOTES
Airway  Date/Time: 10/19/2020 11:15 AM  Urgency: Elective    Airway not difficult    General Information and Staff    Patient location during procedure: OR  Anesthesiologist: Vladimir Liu MD  Performed: anesthesiologist     Indications and Patient Goodman Memos

## 2020-10-19 NOTE — PROGRESS NOTES
[facility] HOSP - [facility]    Progress Note    Mary Dear Patient Status:  Hospital Outpatient Surgery    1965 MRN [id number]   Location [address] UNIT Attending Dina Lott MD   Hosp Day # 0 YING Anderson .0 06/21/2020    CREATSERUM 1.16 06/21/2020    BUN 10 06/21/2020     06/21/2020    K 4.3 06/21/2020     06/21/2020    CO2 31.0 06/21/2020     (H) 06/21/2020    CA 9.1 06/21/2020    ALB 3.7 10/17/2020    ALKPHO 50 10/17/2020

## 2020-10-19 NOTE — H&P
HPI:    Patient ID: Asia Sullivan is a 49year old male presenting with Patient presents with:  Hernia: Patient here for follow up and consult. Patient reports possible recurrent umbilical hernia.   Patient states he had umbilical hernia repair about a year Drug use: No      Sexual activity: Not on file    Lifestyle      Physical activity:        Days per week: Not on file        Minutes per session: Not on file      Stress: Not on file    Relationships      Social connections:        Talks on phone: Not Vitals reviewed. Constitutional: He is oriented to person, place, and time. Vital signs are normal. He appears well-developed and well-nourished. HENT:   Head: Normocephalic and atraumatic. Neck: Normal range of motion. Neck supple.    Cardiovascular:

## 2020-10-19 NOTE — PAYOR COMM NOTE
--------------  ADMISSION REVIEW     Payor: [address]  Subscriber #:  [id number]  Authorization Number: N/A    Admit date: 10/19/20  Admit time: 0       Admitting Physician: Philippe Guzman MD  Attending Physician:  Dennis Alfaro MD  Primary intact distal pulses. No murmur heard. Edema not present. Carotid bruit not present. Abdominal: Soft. He exhibits no distension. There is no abdominal tenderness. There is no rebound and no guarding. ABD DRESSING, HYPOACTIVE BOWEL SOUNDS.   [address] (SUBLIMAZE) 0.05 MG/ML injection 50 mcg     Date Action Dose Route User    10/19/2020 1420 Given 50 mcg Intravenous Diana Jolly RN      fentaNYL citrate (SUBLIMAZE) 0.05 MG/ML injection     Date Action Dose Route User    10/19/2020 1133 Given 100 mc Intravenous Huey Ash MD    10/19/2020 1133 Given 50 mg Intravenous Huey Ash MD

## 2020-10-19 NOTE — ANESTHESIA POSTPROCEDURE EVALUATION
Patient: Neil Healy    Procedure Summary     Date: 10/19/20 Room / Location: [address] [place] [address] MAIN OR  / [address] [place] [address] MAIN OR    Anesthesia Start: 5265 Anesthesia Stop: 1179    Procedure:  HERNIA VENTRAL REPAIR (N/A Abdomen) Diagnosis:       Ventral hernia without obstruction

## 2020-10-19 NOTE — PLAN OF CARE
Patient A&Ox4. Clear liquid diet, advance as tolerated. Up with standby assist. Remote tele for high SHANTELL screening score. Scheduled Toradol for pain. Abdominal dressing dry and intact, with small amount of old drainage. Abdominal binder in place.  Call migue unsuccessful or patient reports new pain  - Anticipate increased pain with activity and pre-medicate as appropriate  Outcome: Progressing     Problem: RISK FOR INFECTION - ADULT  Goal: Absence of fever/infection during anticipated neutropenic period  Descr tolerated  - Nasogastric tube to low intermittent suction as ordered  - Evaluate effectiveness of ordered antiemetic medications  - Provide nonpharmacologic comfort measures as appropriate  - Advance diet as tolerated, if ordered  - Obtain nutritional cons

## 2020-10-20 VITALS
SYSTOLIC BLOOD PRESSURE: 127 MMHG | WEIGHT: 251 LBS | HEART RATE: 93 BPM | RESPIRATION RATE: 18 BRPM | HEIGHT: 72 IN | DIASTOLIC BLOOD PRESSURE: 78 MMHG | BODY MASS INDEX: 34 KG/M2 | OXYGEN SATURATION: 93 % | TEMPERATURE: 99 F

## 2020-10-20 PROCEDURE — 99232 SBSQ HOSP IP/OBS MODERATE 35: CPT | Performed by: HOSPITALIST

## 2020-10-20 RX ORDER — HYDROCODONE BITARTRATE AND ACETAMINOPHEN 5; 325 MG/1; MG/1
1 TABLET ORAL EVERY 4 HOURS PRN
Qty: 42 TABLET | Refills: 0 | Status: SHIPPED | OUTPATIENT
Start: 2020-10-20 | End: 2020-10-28 | Stop reason: ALTCHOICE

## 2020-10-20 NOTE — PAYOR COMM NOTE
--------------  CONTINUED STAY REVIEW    Payor: [facility]  Subscriber #:  [id number]  Authorization Number: [id number]    Admit date: 10/19/20  Admit time: 0    Admitting Physician: Eloisa Marshall MD  Attending Physician:  Shayna Hilario MD 1214 Given 10 mg Intravenous Vicente Iniguez MD      fentaNYL citrate (SUBLIMAZE) 0.05 MG/ML injection 25 mcg     Date Action Dose Route User    10/19/2020 1411 Given 25 mcg Intravenous Bill Later, [facility]    10/19/2020 1405 Given 25 mcg Intravenous Barb Hardy

## 2020-10-20 NOTE — PLAN OF CARE
Patient A&Ox4. Low fiber soft diet, tolerating well. Up independently in room. Remote tele for high SHANTELL screening score. Scheduled Toradol for pain, morning dose held per protocol due to creatinine of 1.5. Oral fluids encouraged. Voiding freely.  Abdominal and/or relaxation techniques  - Monitor for opioid side effects  - Notify MD/LIP if interventions unsuccessful or patient reports new pain  - Anticipate increased pain with activity and pre-medicate as appropriate  Outcome: Progressing     Problem: RISK FO vomiting  Description: INTERVENTIONS:  - Maintain adequate hydration with IV or PO as ordered and tolerated  - Nasogastric tube to low intermittent suction as ordered  - Evaluate effectiveness of ordered antiemetic medications  - Provide nonpharmacologic c

## 2020-10-20 NOTE — TELEPHONE ENCOUNTER
Forms completed and faxed to [facility] at [phone]. Sent [employer].

## 2020-10-20 NOTE — PLAN OF CARE
Pain controlled with scheduled toradol. Diet increased to low fiber/soft as pt is tolerating liquids well, pushing po fluid intake. Pt walking in hallway this evening. May Anticipate discharge today.    Problem: Patient Centered Care  Goal: Patient prefe pre-medicate as appropriate  Outcome: Progressing     Problem: RISK FOR INFECTION - ADULT  Goal: Absence of fever/infection during anticipated neutropenic period  Description: INTERVENTIONS  - Monitor WBC  - Administer growth factors as ordered  - Charlie ordered antiemetic medications  - Provide nonpharmacologic comfort measures as appropriate  - Advance diet as tolerated, if ordered  - Obtain nutritional consult as needed  - Evaluate fluid balance  Outcome: Progressing  Goal: Maintains or returns to basel

## 2020-10-20 NOTE — PROGRESS NOTES
[facility] - [facility]    Progress Note    Marie Chapman Patient Status:  Inpatient    1965 MRN [id number]   Location [facility] 4W/SW/SE Attending Norma Camara MD   Hosp Day # 1 PCP Halle Solorio MD     Assessment and Plan:       Ve 2.1 10/20/2020    RPR Non-Reactive 10/19/2011    B12 407 06/23/2019       No results found.             Kane Maynard MD  10/20/2020

## 2020-10-20 NOTE — DISCHARGE SUMMARY
[facility]    Discharge Summary    Beverly Tinsley Patient Status:  Inpatient    1965 MRN [id number]   Location [facility] 4W/SW/SE Attending Shayna Hilario MD   Hosp Day # 1 PCP Erwin Quinn MD     Date of Admission: 10/19/ Pasquale Pastor MD [facility] OR [facility]            Discharge Plan:   Discharge Condition: Stable    Current Discharge Medication List    New Orders    HYDROcodone-acetaminophen 5-325 MG Oral Tab  Take 1 tablet by mouth every 4 (four) hours as needed.       [facility] Where to Get Your Medications      Please  your prescriptions at the location directed by your doctor or nurse    Bring a paper prescription for each of these medications  · HYDROcodone-acetaminophen 5-325 MG Tabs         Follow up:      Zuri Mckeon after surgery. Adequate hydration and stool softeners are ways to minimize the risk of constipation after surgery. Drink a lot of fluid when you are at home. Check your urine color. If it's dark, then you are dehydrated and need to drink more water.   Kermit Goldberg documents within 24 hours of your procedure   · If you had a nerve block for your surgery, take extra care not to put any pressure on your arm or hand for 24 hours    It is normal:  · For you to have a sore throat if you had a breathing tube during surgery

## 2020-10-20 NOTE — PLAN OF CARE
Problem: Patient Centered Care  Goal: Patient preferences are identified and integrated in the patient's plan of care  Description: Interventions:  - What would you like us to know as we care for you?  I live a home with my  Linn Sox  - Provide timely effects  - Notify MD/LIP if interventions unsuccessful or patient reports new pain  - Anticipate increased pain with activity and pre-medicate as appropriate  10/20/2020 1401 by Neeta Velasquez RN  Outcome: Adequate for Discharge  10/20/2020 1016 by Yoselyn Smith, instruct to report SOB or any respiratory difficulty  - Respiratory Therapy support as indicated  - Manage/alleviate anxiety  - Monitor for signs/symptoms of CO2 retention  10/20/2020 1401 by Corey Merida RN  Outcome: Adequate for Discharge  10/20/2020

## 2020-10-21 NOTE — PAYOR COMM NOTE
--------------  DISCHARGE REVIEW    Payor: [address]  Subscriber #:  [id number]  Authorization Number: [id number]    Admit date: 10/19/20  Admit time:  1881  Discharge Date: 10/20/2020  3:21 PM     Admitting Physician: Shelly Cole MD  Attendin Positive bowel sounds. No rebound or guarding. Neurologic: No focal neurological deficits. Musculoskeletal: Moves all extremities.     Hospital Course:     Ventral hernia  S/P VENTRAL HERNIA REPAIR POD 1, MESH PLACEMENT, BILATERAL MYOCUTANEUS ADVANCEMENT 5     cetirizine 10 MG Tabs  Commonly known as: ZYRTEC      Take 10 mg by mouth daily. Refills: 0     Fluticasone Propionate 50 MCG/ACT Susp  Commonly known as: FLONASE      2 sprays by Nasal route daily.    Quantity: 1 Bottle  Refills: 3     lisinopril 5 have an abdominal binder (medical girdle). Wear it when you are up and moving around. The bottom of the binder should cover a little of your hip bones to provide support to your lower abdomen.   Avoid wearing the binder too high as it may make your discom any time day or night. My cell phone number is [phone]. HOME INSTRUCTIONS  [facility] HOME CARE INSTRUCTIONS: POST-OP ANESTHESIA  The medication that you received for sedation or general anesthesia can last up to 24 hours.  Your judgment and reflex · Call your surgeons office         Time spent:  > 30 minutes    Kana Gonzalez  10/20/2020    Electronically signed by Gordo Lott MD on 10/20/2020 11:51 AM         REVIEWER COMMENTS

## 2020-10-23 ENCOUNTER — TELEPHONE (OUTPATIENT)
Dept: SURGERY | Facility: CLINIC | Age: 55
End: 2020-10-23

## 2020-10-23 NOTE — TELEPHONE ENCOUNTER
Davie Mendez  states she hasn't been able to get in contact with pt and would like to see if someone else can please advise

## 2020-10-23 NOTE — TELEPHONE ENCOUNTER
Left detailed message on personal VM that patient had surgery on 10/21, which is why she cannot reach him and left the office number for further questions.

## 2020-10-27 NOTE — TELEPHONE ENCOUNTER
Dr. Jeremy Beard,     Please sign off on form: Disab  -Highlight the patient and hit \"Chart\" button.   -In Chart Review, w/in the Encounter tab - click 1 time on the Telephone call encounter for 10/7/2020 Scroll down the telephone encounter.  -Click \"scan on\" bl

## 2020-10-28 ENCOUNTER — OFFICE VISIT (OUTPATIENT)
Dept: NEPHROLOGY | Facility: CLINIC | Age: 55
End: 2020-10-28
Payer: COMMERCIAL

## 2020-10-28 VITALS
HEIGHT: 72 IN | WEIGHT: 252.19 LBS | BODY MASS INDEX: 34.16 KG/M2 | DIASTOLIC BLOOD PRESSURE: 84 MMHG | SYSTOLIC BLOOD PRESSURE: 133 MMHG | HEART RATE: 90 BPM

## 2020-10-28 DIAGNOSIS — E78.00 HYPERCHOLESTEREMIA: ICD-10-CM

## 2020-10-28 DIAGNOSIS — K43.9 VENTRAL HERNIA WITHOUT OBSTRUCTION OR GANGRENE: ICD-10-CM

## 2020-10-28 DIAGNOSIS — I10 ESSENTIAL HYPERTENSION: Primary | ICD-10-CM

## 2020-10-28 PROCEDURE — 1111F DSCHRG MED/CURRENT MED MERGE: CPT | Performed by: INTERNAL MEDICINE

## 2020-10-28 PROCEDURE — 99214 OFFICE O/P EST MOD 30 MIN: CPT | Performed by: INTERNAL MEDICINE

## 2020-10-28 PROCEDURE — 3079F DIAST BP 80-89 MM HG: CPT | Performed by: INTERNAL MEDICINE

## 2020-10-28 PROCEDURE — 3008F BODY MASS INDEX DOCD: CPT | Performed by: INTERNAL MEDICINE

## 2020-10-28 PROCEDURE — 3075F SYST BP GE 130 - 139MM HG: CPT | Performed by: INTERNAL MEDICINE

## 2020-10-28 NOTE — PATIENT INSTRUCTIONS
Please do follow-up laboratory studies as ordered. Work on diet, exercise and weight loss. Do the sleep study as ordered.

## 2020-10-28 NOTE — PROGRESS NOTES
[facility], [facility]  Nephrology Daily Progress Note    Vickie Martinez  [id number]  49year old  Patient presents with:  Post-Op: 1 WK SERGERY F/U      HPI:   Vickie Martinez is a 49year old male.   60-year-old male with a history of hypercholesterolemia, migraine hea lbs 252 lbs 3 oz       Constitutional: appears well hydrated alert and responsive no acute distress noted  Neck/Thyroid: neck is supple without adenopathy  Lymphatic: no abnormal cervical, supraclavicular or axillary adenopathy is noted  Respiratory: alphonso 5    Allergies:    Radiology Contrast *    SWELLING, FACE FLUSHING  Sulfa Antibiotics       HIVES         ASSESSMENT/PLAN:   Assessment   Essential hypertension  (primary encounter diagnosis)  Hypercholesteremia  Ventral hernia without obstruction or gangr

## 2020-10-29 ENCOUNTER — LAB ENCOUNTER (OUTPATIENT)
Dept: LAB | Facility: HOSPITAL | Age: 55
End: 2020-10-29
Attending: INTERNAL MEDICINE
Payer: COMMERCIAL

## 2020-10-29 ENCOUNTER — OFFICE VISIT (OUTPATIENT)
Dept: SURGERY | Facility: CLINIC | Age: 55
End: 2020-10-29
Payer: COMMERCIAL

## 2020-10-29 DIAGNOSIS — Z09 POSTOPERATIVE EXAMINATION: Primary | ICD-10-CM

## 2020-10-29 DIAGNOSIS — R94.4 ABNORMAL KIDNEY FUNCTION STUDY: ICD-10-CM

## 2020-10-29 PROCEDURE — 36415 COLL VENOUS BLD VENIPUNCTURE: CPT

## 2020-10-29 PROCEDURE — 80069 RENAL FUNCTION PANEL: CPT

## 2020-10-29 PROCEDURE — 99024 POSTOP FOLLOW-UP VISIT: CPT | Performed by: SURGERY

## 2020-10-29 NOTE — PROGRESS NOTES
Patient presents with:  Post-Op: Pt here for 1st post op s/p ventral hernia repair on 10/19/2020. Pt c/o intermittent discomfort to area - relief w/ tylenol. Pt denies complaints w/ bm's or urination.           O:  VSS  GEN:  No acute distress  Abd:  Soft

## 2020-10-29 NOTE — TELEPHONE ENCOUNTER
Forms completed and faxed to [facility] at [phone]. Sent pt [employer].

## 2020-11-19 ENCOUNTER — PATIENT MESSAGE (OUTPATIENT)
Dept: SURGERY | Facility: CLINIC | Age: 55
End: 2020-11-19

## 2020-11-19 NOTE — TELEPHONE ENCOUNTER
From: Leighton Ormond  To:  Gina Mccallum MD  Sent: 11/19/2020 12:21 PM CST  Subject: Visit Follow-up Question    Dr. Emilia Romano,  For the past few days I have been experiencing leakage/seepage at the bottom of the incision, near my belly button, that looks like diluted b

## 2020-11-20 NOTE — TELEPHONE ENCOUNTER
Spoke with patient, scheduling an appointment for 11/24/2020. Patient states the drainage has since stopped, there is no redness or swelling.

## 2020-11-24 ENCOUNTER — OFFICE VISIT (OUTPATIENT)
Dept: SURGERY | Facility: CLINIC | Age: 55
End: 2020-11-24
Payer: COMMERCIAL

## 2020-11-24 DIAGNOSIS — Z09 POSTOPERATIVE EXAMINATION: Primary | ICD-10-CM

## 2020-11-24 PROCEDURE — 99024 POSTOP FOLLOW-UP VISIT: CPT | Performed by: SURGERY

## 2020-11-25 NOTE — PROGRESS NOTES
Patient presents with:  Post-Op: Pt here for 2nd post op s/p ventral hernia repair on 10/19/2020. Pt states he noticed some discharge from incision.           O:  VSS  GEN:  No acute distress  Abd:  Soft, NTND, incision C/D/I      Assessment   Postoperativ

## 2020-12-18 RX ORDER — ROSUVASTATIN CALCIUM 10 MG/1
10 TABLET, COATED ORAL NIGHTLY
Qty: 90 TABLET | Refills: 1 | Status: SHIPPED | OUTPATIENT
Start: 2020-12-18 | End: 2021-06-01

## 2020-12-18 NOTE — TELEPHONE ENCOUNTER
Refill request:    Current Outpatient Medications   Medication Sig Dispense Refill   • Rosuvastatin Calcium 10 MG Oral Tab Take 1 tablet (10 mg total) by mouth nightly.  90 tablet 0

## 2021-01-29 DIAGNOSIS — I10 ESSENTIAL HYPERTENSION: ICD-10-CM

## 2021-01-29 RX ORDER — LISINOPRIL 5 MG/1
TABLET ORAL
Qty: 30 TABLET | Refills: 3 | Status: SHIPPED | OUTPATIENT
Start: 2021-01-29 | End: 2021-05-24

## 2021-01-29 RX ORDER — AMLODIPINE BESYLATE 5 MG/1
TABLET ORAL
Qty: 30 TABLET | Refills: 3 | Status: SHIPPED | OUTPATIENT
Start: 2021-01-29 | End: 2021-05-24

## 2021-04-09 DIAGNOSIS — Z23 NEED FOR VACCINATION: ICD-10-CM

## 2021-05-22 DIAGNOSIS — E78.00 PURE HYPERCHOLESTEROLEMIA: ICD-10-CM

## 2021-05-22 DIAGNOSIS — Z12.5 SPECIAL SCREENING FOR MALIGNANT NEOPLASM OF PROSTATE: Primary | ICD-10-CM

## 2021-05-22 DIAGNOSIS — I10 ESSENTIAL HYPERTENSION: ICD-10-CM

## 2021-05-24 RX ORDER — AMLODIPINE BESYLATE 5 MG/1
TABLET ORAL
Qty: 30 TABLET | Refills: 0 | Status: SHIPPED | OUTPATIENT
Start: 2021-05-24 | End: 2021-06-23

## 2021-05-24 RX ORDER — LISINOPRIL 5 MG/1
TABLET ORAL
Qty: 30 TABLET | Refills: 0 | Status: SHIPPED | OUTPATIENT
Start: 2021-05-24 | End: 2021-06-23

## 2021-06-01 RX ORDER — ROSUVASTATIN CALCIUM 10 MG/1
TABLET, COATED ORAL
Qty: 30 TABLET | Refills: 0 | Status: SHIPPED | OUTPATIENT
Start: 2021-06-01 | End: 2021-06-23

## 2021-06-02 ENCOUNTER — PATIENT MESSAGE (OUTPATIENT)
Dept: NEPHROLOGY | Facility: CLINIC | Age: 56
End: 2021-06-02

## 2021-06-02 NOTE — TELEPHONE ENCOUNTER
Spoke with patient, discussed below message. Verbalizes understanding. Informed pt 12 hour fast required for lab work. F/U apt booked for 6/28.

## 2021-06-02 NOTE — TELEPHONE ENCOUNTER
From: Marion Counter  To: Thersia Gaucher, MD  Sent: 6/2/2021 3:34 PM CDT  Subject: Non-Urgent Medical Question    Jacob Mcduffie,  I scheduled my annual physical for Mon, Jun 28 at 2:40 pm, so I can review the lab results with Dr. Itzel Morrison at that time.  I plan to comp

## 2021-06-20 ENCOUNTER — LAB ENCOUNTER (OUTPATIENT)
Dept: LAB | Facility: HOSPITAL | Age: 56
End: 2021-06-20
Attending: INTERNAL MEDICINE
Payer: COMMERCIAL

## 2021-06-20 DIAGNOSIS — E78.00 PURE HYPERCHOLESTEROLEMIA: ICD-10-CM

## 2021-06-20 DIAGNOSIS — I10 ESSENTIAL HYPERTENSION: ICD-10-CM

## 2021-06-20 DIAGNOSIS — Z12.5 SPECIAL SCREENING FOR MALIGNANT NEOPLASM OF PROSTATE: ICD-10-CM

## 2021-06-20 PROCEDURE — 80061 LIPID PANEL: CPT

## 2021-06-20 PROCEDURE — 85025 COMPLETE CBC W/AUTO DIFF WBC: CPT

## 2021-06-20 PROCEDURE — 81001 URINALYSIS AUTO W/SCOPE: CPT

## 2021-06-20 PROCEDURE — 36415 COLL VENOUS BLD VENIPUNCTURE: CPT

## 2021-06-20 PROCEDURE — 80053 COMPREHEN METABOLIC PANEL: CPT

## 2021-06-23 DIAGNOSIS — I10 ESSENTIAL HYPERTENSION: ICD-10-CM

## 2021-06-23 RX ORDER — AMLODIPINE BESYLATE 5 MG/1
TABLET ORAL
Qty: 30 TABLET | Refills: 0 | Status: SHIPPED | OUTPATIENT
Start: 2021-06-23 | End: 2021-07-21

## 2021-06-23 RX ORDER — ROSUVASTATIN CALCIUM 10 MG/1
TABLET, COATED ORAL
Qty: 90 TABLET | Refills: 1 | Status: SHIPPED | OUTPATIENT
Start: 2021-06-23 | End: 2021-11-12

## 2021-06-23 RX ORDER — LISINOPRIL 5 MG/1
TABLET ORAL
Qty: 30 TABLET | Refills: 0 | Status: SHIPPED | OUTPATIENT
Start: 2021-06-23 | End: 2021-07-21

## 2021-06-23 NOTE — TELEPHONE ENCOUNTER
Last seen 10/28/2020. Return to clinic in 6 months (4/21) Followup scheduled for 6/28/21. Refill(s) pended and routed to Dr. Alan Mcarthur.

## 2021-06-28 ENCOUNTER — OFFICE VISIT (OUTPATIENT)
Dept: NEPHROLOGY | Facility: CLINIC | Age: 56
End: 2021-06-28
Payer: COMMERCIAL

## 2021-06-28 VITALS
HEART RATE: 83 BPM | HEIGHT: 72 IN | WEIGHT: 236 LBS | SYSTOLIC BLOOD PRESSURE: 103 MMHG | DIASTOLIC BLOOD PRESSURE: 61 MMHG | BODY MASS INDEX: 31.97 KG/M2

## 2021-06-28 DIAGNOSIS — E78.00 PURE HYPERCHOLESTEROLEMIA: ICD-10-CM

## 2021-06-28 DIAGNOSIS — I10 ESSENTIAL HYPERTENSION: Primary | ICD-10-CM

## 2021-06-28 PROCEDURE — 99214 OFFICE O/P EST MOD 30 MIN: CPT | Performed by: INTERNAL MEDICINE

## 2021-06-28 PROCEDURE — 3074F SYST BP LT 130 MM HG: CPT | Performed by: INTERNAL MEDICINE

## 2021-06-28 PROCEDURE — 3078F DIAST BP <80 MM HG: CPT | Performed by: INTERNAL MEDICINE

## 2021-06-28 PROCEDURE — 3008F BODY MASS INDEX DOCD: CPT | Performed by: INTERNAL MEDICINE

## 2021-06-28 NOTE — PATIENT INSTRUCTIONS
Continue to watch diet and exercise. Let me know if you are still having spells where you stop breathing at night.

## 2021-06-28 NOTE — PROGRESS NOTES
[facility], [facility]  Nephrology Daily Progress Note    René Garcias  [id number]  48year old  Patient presents with: Follow - Up: FOLLOW UP ANNUAL CHECK UP      HPI:   René Garcias is a 48year old male.   60-year-old male with a history of hypercholesterolemia, responsive no acute distress noted  Neck/Thyroid: neck is supple without adenopathy  Lymphatic: no abnormal cervical, supraclavicular or axillary adenopathy is noted  Respiratory: normal to inspection lungs are clear to auscultation bilaterally normal resp disease     Ventral hernia     Essential hypertension     Hyperlipidemia    Overall is doing well. Did lose 16 pounds. Initial blood pressure seems borderline low but actually repeat blood pressure is 122/78 without orthostatic changes.   Encourage regula

## 2021-07-21 DIAGNOSIS — I10 ESSENTIAL HYPERTENSION: ICD-10-CM

## 2021-07-21 RX ORDER — LISINOPRIL 5 MG/1
TABLET ORAL
Qty: 30 TABLET | Refills: 4 | Status: SHIPPED | OUTPATIENT
Start: 2021-07-21 | End: 2021-12-13

## 2021-07-21 RX ORDER — AMLODIPINE BESYLATE 5 MG/1
TABLET ORAL
Qty: 30 TABLET | Refills: 4 | Status: SHIPPED | OUTPATIENT
Start: 2021-07-21 | End: 2021-12-13

## 2021-11-10 ENCOUNTER — PATIENT MESSAGE (OUTPATIENT)
Dept: NEPHROLOGY | Facility: CLINIC | Age: 56
End: 2021-11-10

## 2021-11-10 NOTE — TELEPHONE ENCOUNTER
From: Mary Dear  To: Capo Anderson MD  Sent: 11/10/2021 9:52 AM CST  Subject: Pain in mid chest, right side    Dr. Irene Sahu,  I had my Covid booster shot (#3) on Sunday at [place].  On Mon night/Tue morning and Tue night/Wed morning I have experienced

## 2021-11-10 NOTE — TELEPHONE ENCOUNTER
Please see below message. Spoke with patient, states he is having acute chest pain that comes and goes but the pain is so severe at times, he has a hard time taking a deep breath.  Pt requesting if this could be side effect of COVID boster Advised pt to

## 2021-11-11 NOTE — TELEPHONE ENCOUNTER
Pt states he didn't go to the ER. States he feels a lot better than he did yesterday- still experiencing that pain on his side and some night sweats. Pt refusing to go to ER. States he will \"keep an eye on it\" and go to ER if symptoms worsen.

## 2021-11-12 RX ORDER — ROSUVASTATIN CALCIUM 10 MG/1
TABLET, COATED ORAL
Qty: 90 TABLET | Refills: 0 | Status: SHIPPED | OUTPATIENT
Start: 2021-11-12 | End: 2021-12-24

## 2021-12-13 DIAGNOSIS — I10 ESSENTIAL HYPERTENSION: ICD-10-CM

## 2021-12-14 RX ORDER — AMLODIPINE BESYLATE 5 MG/1
5 TABLET ORAL DAILY
Qty: 30 TABLET | Refills: 2 | Status: SHIPPED | OUTPATIENT
Start: 2021-12-14

## 2021-12-14 RX ORDER — LISINOPRIL 5 MG/1
5 TABLET ORAL DAILY
Qty: 30 TABLET | Refills: 2 | Status: SHIPPED | OUTPATIENT
Start: 2021-12-14

## 2021-12-15 ENCOUNTER — PATIENT MESSAGE (OUTPATIENT)
Dept: NEPHROLOGY | Facility: CLINIC | Age: 56
End: 2021-12-15

## 2021-12-15 DIAGNOSIS — E78.00 PURE HYPERCHOLESTEROLEMIA: Primary | ICD-10-CM

## 2021-12-15 DIAGNOSIS — Z00.00 BLOOD TESTS FOR ROUTINE GENERAL PHYSICAL EXAMINATION: ICD-10-CM

## 2021-12-15 NOTE — TELEPHONE ENCOUNTER
From: Santa Ayers  To:  Tremayne Dickey MD  Sent: 12/15/2021 11:49 AM CST  Subject: Lab work needed and physician referrals    Dr. Maura Rogers,   I just received the difficult news that you are no longer going to be seeing Internal Medicine patients and while I

## 2021-12-15 NOTE — TELEPHONE ENCOUNTER
Sabrina for the kind words and best of luck. Please give him list of MDs.   Also do CMP, lipid panel and glycohb

## 2021-12-15 NOTE — TELEPHONE ENCOUNTER
Spoke with patient regarding message below. Patient states that he did not receive a letter, put one in the mail today.

## 2021-12-18 ENCOUNTER — LAB ENCOUNTER (OUTPATIENT)
Dept: LAB | Facility: REFERENCE LAB | Age: 56
End: 2021-12-18
Attending: INTERNAL MEDICINE
Payer: COMMERCIAL

## 2021-12-18 DIAGNOSIS — Z00.00 BLOOD TESTS FOR ROUTINE GENERAL PHYSICAL EXAMINATION: ICD-10-CM

## 2021-12-18 DIAGNOSIS — E78.00 PURE HYPERCHOLESTEROLEMIA: ICD-10-CM

## 2021-12-18 PROCEDURE — 36415 COLL VENOUS BLD VENIPUNCTURE: CPT

## 2021-12-18 PROCEDURE — 80061 LIPID PANEL: CPT

## 2021-12-18 PROCEDURE — 80053 COMPREHEN METABOLIC PANEL: CPT

## 2021-12-18 PROCEDURE — 83036 HEMOGLOBIN GLYCOSYLATED A1C: CPT

## 2021-12-24 ENCOUNTER — TELEPHONE (OUTPATIENT)
Dept: NEPHROLOGY | Facility: CLINIC | Age: 56
End: 2021-12-24

## 2021-12-24 DIAGNOSIS — E78.00 PURE HYPERCHOLESTEROLEMIA: Primary | ICD-10-CM

## 2021-12-24 RX ORDER — ROSUVASTATIN CALCIUM 20 MG/1
20 TABLET, COATED ORAL NIGHTLY
Qty: 30 TABLET | Refills: 2 | Status: SHIPPED | OUTPATIENT
Start: 2021-12-24

## 2021-12-24 NOTE — TELEPHONE ENCOUNTER
Blood sugars remain mildly elevated. Watch diet and exercise carefully. LDL cholesterol is too high. If taking Crestor 10 mg daily regularly increase to 20 mg daily. Repeat liver and lipid panel in 6 weeks.

## 2022-01-01 NOTE — TELEPHONE ENCOUNTER
Lab orders entered in Epic Patient contacted and advised to do lab work in 3 months as Dr. Itzel Morrison advised. Age appropriate

## 2022-01-10 RX ORDER — ZOLPIDEM TARTRATE 10 MG/1
TABLET ORAL
Qty: 10 TABLET | Refills: 0 | Status: SHIPPED | OUTPATIENT
Start: 2022-01-10

## 2022-02-24 ENCOUNTER — OFFICE VISIT (OUTPATIENT)
Dept: INTERNAL MEDICINE CLINIC | Facility: CLINIC | Age: 57
End: 2022-02-24
Payer: COMMERCIAL

## 2022-02-24 VITALS
HEIGHT: 72 IN | HEART RATE: 73 BPM | WEIGHT: 245 LBS | SYSTOLIC BLOOD PRESSURE: 138 MMHG | OXYGEN SATURATION: 98 % | DIASTOLIC BLOOD PRESSURE: 78 MMHG | BODY MASS INDEX: 33.18 KG/M2

## 2022-02-24 DIAGNOSIS — D18.01 CHERRY HEMANGIOMA: ICD-10-CM

## 2022-02-24 DIAGNOSIS — E66.9 OBESITY (BMI 30-39.9): ICD-10-CM

## 2022-02-24 DIAGNOSIS — G47.09 OTHER INSOMNIA: ICD-10-CM

## 2022-02-24 DIAGNOSIS — L82.1 SEBORRHEIC KERATOSIS: ICD-10-CM

## 2022-02-24 DIAGNOSIS — Z00.00 HEALTH MAINTENANCE EXAMINATION: Primary | ICD-10-CM

## 2022-02-24 DIAGNOSIS — E78.5 HYPERLIPIDEMIA, UNSPECIFIED HYPERLIPIDEMIA TYPE: ICD-10-CM

## 2022-02-24 DIAGNOSIS — R73.03 PREDIABETES: ICD-10-CM

## 2022-02-24 DIAGNOSIS — D22.9 NUMEROUS MOLES: ICD-10-CM

## 2022-02-24 DIAGNOSIS — I10 ESSENTIAL HYPERTENSION: Chronic | ICD-10-CM

## 2022-02-24 PROBLEM — K43.9 VENTRAL HERNIA: Status: RESOLVED | Noted: 2020-10-19 | Resolved: 2022-02-24

## 2022-02-24 PROBLEM — M62.08 RECTUS DIASTASIS: Status: RESOLVED | Noted: 2019-06-26 | Resolved: 2022-02-24

## 2022-02-24 PROCEDURE — 3078F DIAST BP <80 MM HG: CPT | Performed by: FAMILY MEDICINE

## 2022-02-24 PROCEDURE — 90750 HZV VACC RECOMBINANT IM: CPT | Performed by: FAMILY MEDICINE

## 2022-02-24 PROCEDURE — 3075F SYST BP GE 130 - 139MM HG: CPT | Performed by: FAMILY MEDICINE

## 2022-02-24 PROCEDURE — 90471 IMMUNIZATION ADMIN: CPT | Performed by: FAMILY MEDICINE

## 2022-02-24 PROCEDURE — 3008F BODY MASS INDEX DOCD: CPT | Performed by: FAMILY MEDICINE

## 2022-02-24 PROCEDURE — 99386 PREV VISIT NEW AGE 40-64: CPT | Performed by: FAMILY MEDICINE

## 2022-02-24 PROCEDURE — 90472 IMMUNIZATION ADMIN EACH ADD: CPT | Performed by: FAMILY MEDICINE

## 2022-02-24 PROCEDURE — 96127 BRIEF EMOTIONAL/BEHAV ASSMT: CPT | Performed by: FAMILY MEDICINE

## 2022-02-24 PROCEDURE — 90715 TDAP VACCINE 7 YRS/> IM: CPT | Performed by: FAMILY MEDICINE

## 2022-02-24 RX ORDER — ROSUVASTATIN CALCIUM 10 MG/1
10 TABLET, COATED ORAL NIGHTLY
Qty: 90 TABLET | Refills: 3 | Status: SHIPPED | OUTPATIENT
Start: 2022-02-24

## 2022-02-25 NOTE — ASSESSMENT & PLAN NOTE
Blood pressure under control. On amlodipine 5 mg daily and lisinopril 5 mg daily. Gave option of just combining into one medication (would consider combining into just lisinopril), but because his blood pressure is under control he prefers to just stay on this current regimen.

## 2022-02-25 NOTE — ASSESSMENT & PLAN NOTE
Reports swelling after rosuvastatin dosage increased. Will go back down on rosuvastatin dosage to 10 mg daily.

## 2022-02-25 NOTE — ASSESSMENT & PLAN NOTE
Mostly associated with travel. Can use zolpidem as needed when traveling - advised not to use on a regular basis (he does not).

## 2022-02-25 NOTE — ASSESSMENT & PLAN NOTE
Exercise and diet advised. Check hep C screen in future. Tdap today. Shingles vaccine today  Advanced directive information provided.

## 2022-04-24 DIAGNOSIS — I10 ESSENTIAL HYPERTENSION: ICD-10-CM

## 2022-04-26 RX ORDER — AMLODIPINE BESYLATE 5 MG/1
5 TABLET ORAL DAILY
Qty: 90 TABLET | Refills: 2 | Status: SHIPPED | OUTPATIENT
Start: 2022-04-26 | End: 2023-01-03

## 2022-04-26 RX ORDER — LISINOPRIL 5 MG/1
5 TABLET ORAL DAILY
Qty: 90 TABLET | Refills: 2 | Status: SHIPPED | OUTPATIENT
Start: 2022-04-26 | End: 2023-01-03

## 2022-05-12 ENCOUNTER — LAB ENCOUNTER (OUTPATIENT)
Dept: LAB | Facility: HOSPITAL | Age: 57
End: 2022-05-12
Attending: INTERNAL MEDICINE
Payer: COMMERCIAL

## 2022-05-12 DIAGNOSIS — E78.00 PURE HYPERCHOLESTEROLEMIA: ICD-10-CM

## 2022-05-12 LAB
ALBUMIN SERPL-MCNC: 4 G/DL (ref 3.4–5)
ALP LIVER SERPL-CCNC: 43 U/L
ALT SERPL-CCNC: 30 U/L
AST SERPL-CCNC: 29 U/L (ref 15–37)
BILIRUB DIRECT SERPL-MCNC: 0.2 MG/DL (ref 0–0.2)
BILIRUB SERPL-MCNC: 0.7 MG/DL (ref 0.1–2)
CHOLEST SERPL-MCNC: 149 MG/DL (ref ?–200)
FASTING PATIENT LIPID ANSWER: YES
HDLC SERPL-MCNC: 41 MG/DL (ref 40–59)
LDLC SERPL CALC-MCNC: 87 MG/DL (ref ?–100)
NONHDLC SERPL-MCNC: 108 MG/DL (ref ?–130)
PROT SERPL-MCNC: 7.4 G/DL (ref 6.4–8.2)
TRIGL SERPL-MCNC: 114 MG/DL (ref 30–149)
VLDLC SERPL CALC-MCNC: 18 MG/DL (ref 0–30)

## 2022-05-12 PROCEDURE — 80061 LIPID PANEL: CPT

## 2022-05-12 PROCEDURE — 36415 COLL VENOUS BLD VENIPUNCTURE: CPT

## 2022-05-12 PROCEDURE — 80076 HEPATIC FUNCTION PANEL: CPT

## 2023-01-31 DIAGNOSIS — E78.5 HYPERLIPIDEMIA, UNSPECIFIED HYPERLIPIDEMIA TYPE: ICD-10-CM

## 2023-01-31 RX ORDER — ROSUVASTATIN CALCIUM 10 MG/1
TABLET, COATED ORAL
Qty: 90 TABLET | Refills: 0 | Status: SHIPPED | OUTPATIENT
Start: 2023-01-31

## 2023-03-24 ENCOUNTER — OFFICE VISIT (OUTPATIENT)
Dept: INTERNAL MEDICINE CLINIC | Facility: CLINIC | Age: 58
End: 2023-03-24
Payer: COMMERCIAL

## 2023-03-24 VITALS
OXYGEN SATURATION: 99 % | SYSTOLIC BLOOD PRESSURE: 140 MMHG | DIASTOLIC BLOOD PRESSURE: 82 MMHG | WEIGHT: 246 LBS | HEART RATE: 76 BPM | BODY MASS INDEX: 33.32 KG/M2 | HEIGHT: 72 IN | RESPIRATION RATE: 17 BRPM

## 2023-03-24 DIAGNOSIS — I10 ESSENTIAL HYPERTENSION: Chronic | ICD-10-CM

## 2023-03-24 DIAGNOSIS — J30.2 SEASONAL ALLERGIES: ICD-10-CM

## 2023-03-24 DIAGNOSIS — R73.03 PREDIABETES: ICD-10-CM

## 2023-03-24 DIAGNOSIS — L82.1 SEBORRHEIC KERATOSIS: ICD-10-CM

## 2023-03-24 DIAGNOSIS — D22.9 NUMEROUS MOLES: ICD-10-CM

## 2023-03-24 DIAGNOSIS — E66.09 CLASS 1 OBESITY DUE TO EXCESS CALORIES WITH SERIOUS COMORBIDITY AND BODY MASS INDEX (BMI) OF 33.0 TO 33.9 IN ADULT: ICD-10-CM

## 2023-03-24 DIAGNOSIS — R20.2 PARESTHESIA: ICD-10-CM

## 2023-03-24 DIAGNOSIS — Z91.89 AT RISK FOR SEXUALLY TRANSMITTED DISEASE DUE TO PARTNER WITH HIV: ICD-10-CM

## 2023-03-24 DIAGNOSIS — Z00.00 HEALTH MAINTENANCE EXAMINATION: Primary | ICD-10-CM

## 2023-03-24 DIAGNOSIS — E78.5 HYPERLIPIDEMIA, UNSPECIFIED HYPERLIPIDEMIA TYPE: Chronic | ICD-10-CM

## 2023-03-24 DIAGNOSIS — D18.01 CHERRY HEMANGIOMA: ICD-10-CM

## 2023-03-24 DIAGNOSIS — G47.09 OTHER INSOMNIA: ICD-10-CM

## 2023-03-24 PROBLEM — B20 HUMAN IMMUNODEFICIENCY VIRUS (HIV) DISEASE (HCC): Status: ACTIVE | Noted: 2023-03-24

## 2023-03-24 PROBLEM — E66.811 CLASS 1 OBESITY DUE TO EXCESS CALORIES WITH SERIOUS COMORBIDITY AND BODY MASS INDEX (BMI) OF 33.0 TO 33.9 IN ADULT: Status: ACTIVE | Noted: 2022-02-24

## 2023-03-24 LAB
ALBUMIN SERPL-MCNC: 3.9 G/DL (ref 3.4–5)
ALBUMIN/GLOB SERPL: 1 {RATIO} (ref 1–2)
ALP LIVER SERPL-CCNC: 47 U/L
ALT SERPL-CCNC: 31 U/L
ANION GAP SERPL CALC-SCNC: 5 MMOL/L (ref 0–18)
AST SERPL-CCNC: 16 U/L (ref 15–37)
BILIRUB SERPL-MCNC: 0.7 MG/DL (ref 0.1–2)
BUN BLD-MCNC: 13 MG/DL (ref 7–18)
BUN/CREAT SERPL: 10.7 (ref 10–20)
CALCIUM BLD-MCNC: 9.7 MG/DL (ref 8.5–10.1)
CHLORIDE SERPL-SCNC: 108 MMOL/L (ref 98–112)
CHOLEST SERPL-MCNC: 145 MG/DL (ref ?–200)
CO2 SERPL-SCNC: 30 MMOL/L (ref 21–32)
COMPLEXED PSA SERPL-MCNC: 3.38 NG/ML (ref ?–4)
CREAT BLD-MCNC: 1.22 MG/DL
EST. AVERAGE GLUCOSE BLD GHB EST-MCNC: 131 MG/DL (ref 68–126)
FASTING PATIENT LIPID ANSWER: YES
FASTING STATUS PATIENT QL REPORTED: YES
GFR SERPLBLD BASED ON 1.73 SQ M-ARVRAT: 70 ML/MIN/1.73M2 (ref 60–?)
GLOBULIN PLAS-MCNC: 3.8 G/DL (ref 2.8–4.4)
GLUCOSE BLD-MCNC: 129 MG/DL (ref 70–99)
HBA1C MFR BLD: 6.2 % (ref ?–5.7)
HCV AB SERPL QL IA: NONREACTIVE
HDLC SERPL-MCNC: 43 MG/DL (ref 40–59)
LDLC SERPL CALC-MCNC: 80 MG/DL (ref ?–100)
NONHDLC SERPL-MCNC: 102 MG/DL (ref ?–130)
OSMOLALITY SERPL CALC.SUM OF ELEC: 298 MOSM/KG (ref 275–295)
POTASSIUM SERPL-SCNC: 4.5 MMOL/L (ref 3.5–5.1)
PROT SERPL-MCNC: 7.7 G/DL (ref 6.4–8.2)
SODIUM SERPL-SCNC: 143 MMOL/L (ref 136–145)
TRIGL SERPL-MCNC: 125 MG/DL (ref 30–149)
VLDLC SERPL CALC-MCNC: 20 MG/DL (ref 0–30)

## 2023-03-24 PROCEDURE — 3079F DIAST BP 80-89 MM HG: CPT | Performed by: FAMILY MEDICINE

## 2023-03-24 PROCEDURE — 99396 PREV VISIT EST AGE 40-64: CPT | Performed by: FAMILY MEDICINE

## 2023-03-24 PROCEDURE — 84153 ASSAY OF PSA TOTAL: CPT | Performed by: FAMILY MEDICINE

## 2023-03-24 PROCEDURE — 87389 HIV-1 AG W/HIV-1&-2 AB AG IA: CPT | Performed by: FAMILY MEDICINE

## 2023-03-24 PROCEDURE — 3077F SYST BP >= 140 MM HG: CPT | Performed by: FAMILY MEDICINE

## 2023-03-24 PROCEDURE — 80061 LIPID PANEL: CPT | Performed by: FAMILY MEDICINE

## 2023-03-24 PROCEDURE — 83036 HEMOGLOBIN GLYCOSYLATED A1C: CPT | Performed by: FAMILY MEDICINE

## 2023-03-24 PROCEDURE — 86803 HEPATITIS C AB TEST: CPT | Performed by: FAMILY MEDICINE

## 2023-03-24 PROCEDURE — 80053 COMPREHEN METABOLIC PANEL: CPT | Performed by: FAMILY MEDICINE

## 2023-03-24 PROCEDURE — 3008F BODY MASS INDEX DOCD: CPT | Performed by: FAMILY MEDICINE

## 2023-03-24 RX ORDER — ZOLPIDEM TARTRATE 5 MG/1
5 TABLET ORAL NIGHTLY PRN
Qty: 10 TABLET | Refills: 0 | Status: SHIPPED | OUTPATIENT
Start: 2023-03-24

## 2023-03-24 RX ORDER — ROSUVASTATIN CALCIUM 10 MG/1
10 TABLET, COATED ORAL NIGHTLY
Qty: 90 TABLET | Refills: 3 | Status: SHIPPED | OUTPATIENT
Start: 2023-03-24

## 2023-03-24 RX ORDER — LOSARTAN POTASSIUM 25 MG/1
25 TABLET ORAL DAILY
Qty: 90 TABLET | Refills: 0 | Status: SHIPPED | OUTPATIENT
Start: 2023-03-24

## 2023-03-24 RX ORDER — EMTRICITABINE AND TENOFOVIR DISOPROXIL FUMARATE 200; 300 MG/1; MG/1
1 TABLET, FILM COATED ORAL DAILY
Qty: 90 TABLET | Refills: 0 | Status: SHIPPED | OUTPATIENT
Start: 2023-03-24

## 2023-03-24 NOTE — ASSESSMENT & PLAN NOTE
Continue rosuvastatin 10 mg daily for now. Questionable swelling history with rosuvastatin. Check CMP and lipid panel today.

## 2023-03-24 NOTE — ASSESSMENT & PLAN NOTE
Patient with history of seasonal allergies  Small amount of fluid behind the tympanic membrane bilaterally.   Discussed that he can use cetirizine regularly  Can also use Flonase as needed

## 2023-03-24 NOTE — PATIENT INSTRUCTIONS
PATIENT INSTRUCTIONS    Thank you for seeing me today, it was a pleasure taking care of you. Please check out at the  and schedule a follow up appointment. Return in about 2 weeks (around 4/7/2023) for follow up. Please get your labs drawn - you may need to schedule a lab appointment if this was not completed at your recent doctor's visit. The following imaging studies were ordered: None  Please also follow up with the following specialists: Dermatologist  Please fill out the advance directive information (power of  documents) and bring a copy to our clinic. Stop your amlodipine and your lisinopril  Losartan 25 mg daily   Monitor your blood pressures at home, if after 5 days blood pressure > 140/90 double to 50 mg (take two at the same time - do this indefinitely until you see me)  Please monitor your blood pressures at home using a blood pressure machine with a cuff that goes over your arm (not your wrist). If you do not have a blood pressure machine, you can consider purchasing an [employer] blood pressure machine (available on [address] [facility] [address]). Please check your blood pressures at once a day and record your blood pressures in a log. Please bring your blood pressure log to your next doctor's appointment with me. Please also bring your blood pressure machine to your next doctor's appointment to see if it is accurate. Monitor tingling for now, avoid heavy lifting with your left arm  If congested, restart your cetirizine (zyrtec).  Can also try fluticasone (flonase) nasal spray        Naveen,   Dr. Lilia Wade

## 2023-03-24 NOTE — ASSESSMENT & PLAN NOTE
Given potential side effects of medications, will switch patient to losartan at this time. Start losartan 25 mg daily. If blood pressure greater than 140/90 after 5 days advise increasing medication to 50 mg daily. Follow-up in 2 weeks to reassess blood pressures.

## 2023-03-24 NOTE — ASSESSMENT & PLAN NOTE
Exercise and diet advised. CMP, lipid panel, Hba1c, HIV screen, hepatitis C screen, PSA  Advanced directive information provided.

## 2023-03-24 NOTE — ASSESSMENT & PLAN NOTE
Intermittent tingling in the superior aspect of the left upper extremity. Advise avoiding heavy lifting for now  Could be related to muscle strain and tendinitis  Advised resting the arm. Consider physical therapy  If severe can also consider obtaining EMG.

## 2023-03-24 NOTE — ASSESSMENT & PLAN NOTE
History of numerous moles. Reports recent precancerous lesion on left ear. Can continue to follow with dermatology. Patient expressed no known problems or needs

## 2023-03-24 NOTE — ASSESSMENT & PLAN NOTE
Interested in [facility] [place]. Check CMP HIV screen. Follow-up every 3 months for management.

## 2023-06-19 DIAGNOSIS — I10 ESSENTIAL HYPERTENSION: Chronic | ICD-10-CM

## 2023-06-22 RX ORDER — LOSARTAN POTASSIUM 25 MG/1
TABLET ORAL
Qty: 90 TABLET | Refills: 0 | Status: SHIPPED | OUTPATIENT
Start: 2023-06-22

## 2023-06-22 RX ORDER — EMTRICITABINE AND TENOFOVIR DISOPROXIL FUMARATE 200; 300 MG/1; MG/1
1 TABLET, FILM COATED ORAL DAILY
Qty: 90 TABLET | Refills: 0 | OUTPATIENT
Start: 2023-06-22

## 2023-07-03 RX ORDER — EMTRICITABINE AND TENOFOVIR DISOPROXIL FUMARATE 200; 300 MG/1; MG/1
1 TABLET, FILM COATED ORAL DAILY
Qty: 90 TABLET | Refills: 0 | OUTPATIENT
Start: 2023-07-03

## 2023-09-07 ENCOUNTER — OFFICE VISIT (OUTPATIENT)
Dept: INTERNAL MEDICINE CLINIC | Facility: CLINIC | Age: 58
End: 2023-09-07
Payer: COMMERCIAL

## 2023-09-07 VITALS
WEIGHT: 239 LBS | HEART RATE: 84 BPM | DIASTOLIC BLOOD PRESSURE: 86 MMHG | HEIGHT: 72 IN | BODY MASS INDEX: 32.37 KG/M2 | TEMPERATURE: 98 F | OXYGEN SATURATION: 98 % | SYSTOLIC BLOOD PRESSURE: 134 MMHG

## 2023-09-07 DIAGNOSIS — J30.2 SEASONAL ALLERGIES: ICD-10-CM

## 2023-09-07 DIAGNOSIS — Z91.89 AT RISK FOR SEXUALLY TRANSMITTED DISEASE DUE TO PARTNER WITH HIV: Primary | ICD-10-CM

## 2023-09-07 DIAGNOSIS — L30.9 DERMATITIS: ICD-10-CM

## 2023-09-07 DIAGNOSIS — R20.2 PARESTHESIA: ICD-10-CM

## 2023-09-07 DIAGNOSIS — E78.5 HYPERLIPIDEMIA, UNSPECIFIED HYPERLIPIDEMIA TYPE: Chronic | ICD-10-CM

## 2023-09-07 DIAGNOSIS — R41.0 CONFUSION: ICD-10-CM

## 2023-09-07 DIAGNOSIS — R73.03 PREDIABETES: ICD-10-CM

## 2023-09-07 DIAGNOSIS — I10 ESSENTIAL HYPERTENSION: Chronic | ICD-10-CM

## 2023-09-07 PROBLEM — K43.9 VENTRAL HERNIA WITHOUT OBSTRUCTION OR GANGRENE: Status: RESOLVED | Noted: 2019-06-26 | Resolved: 2023-09-07

## 2023-09-07 LAB
ALBUMIN SERPL-MCNC: 4 G/DL (ref 3.4–5)
ALBUMIN/GLOB SERPL: 1.1 {RATIO} (ref 1–2)
ALP LIVER SERPL-CCNC: 47 U/L
ALT SERPL-CCNC: 33 U/L
ANION GAP SERPL CALC-SCNC: 3 MMOL/L (ref 0–18)
AST SERPL-CCNC: 22 U/L (ref 15–37)
BASOPHILS # BLD AUTO: 0.03 X10(3) UL (ref 0–0.2)
BASOPHILS NFR BLD AUTO: 0.6 %
BILIRUB SERPL-MCNC: 0.7 MG/DL (ref 0.1–2)
BUN BLD-MCNC: 14 MG/DL (ref 7–18)
BUN/CREAT SERPL: 11.4 (ref 10–20)
CALCIUM BLD-MCNC: 10 MG/DL (ref 8.5–10.1)
CHLORIDE SERPL-SCNC: 109 MMOL/L (ref 98–112)
CO2 SERPL-SCNC: 30 MMOL/L (ref 21–32)
CREAT BLD-MCNC: 1.23 MG/DL
DEPRECATED RDW RBC AUTO: 40.5 FL (ref 35.1–46.3)
EGFRCR SERPLBLD CKD-EPI 2021: 69 ML/MIN/1.73M2 (ref 60–?)
EOSINOPHIL # BLD AUTO: 0.05 X10(3) UL (ref 0–0.7)
EOSINOPHIL NFR BLD AUTO: 1.1 %
ERYTHROCYTE [DISTWIDTH] IN BLOOD BY AUTOMATED COUNT: 11.6 % (ref 11–15)
FASTING STATUS PATIENT QL REPORTED: NO
GLOBULIN PLAS-MCNC: 3.6 G/DL (ref 2.8–4.4)
GLUCOSE BLD-MCNC: 90 MG/DL (ref 70–99)
HCT VFR BLD AUTO: 47 %
HGB BLD-MCNC: 15.3 G/DL
IMM GRANULOCYTES # BLD AUTO: 0.01 X10(3) UL (ref 0–1)
IMM GRANULOCYTES NFR BLD: 0.2 %
LYMPHOCYTES # BLD AUTO: 1.19 X10(3) UL (ref 1–4)
LYMPHOCYTES NFR BLD AUTO: 25.3 %
MCH RBC QN AUTO: 31.2 PG (ref 26–34)
MCHC RBC AUTO-ENTMCNC: 32.6 G/DL (ref 31–37)
MCV RBC AUTO: 95.7 FL
MONOCYTES # BLD AUTO: 0.36 X10(3) UL (ref 0.1–1)
MONOCYTES NFR BLD AUTO: 7.7 %
NEUTROPHILS # BLD AUTO: 3.06 X10 (3) UL (ref 1.5–7.7)
NEUTROPHILS # BLD AUTO: 3.06 X10(3) UL (ref 1.5–7.7)
NEUTROPHILS NFR BLD AUTO: 65.1 %
OSMOLALITY SERPL CALC.SUM OF ELEC: 294 MOSM/KG (ref 275–295)
PLATELET # BLD AUTO: 197 10(3)UL (ref 150–450)
POTASSIUM SERPL-SCNC: 4.5 MMOL/L (ref 3.5–5.1)
PROT SERPL-MCNC: 7.6 G/DL (ref 6.4–8.2)
RBC # BLD AUTO: 4.91 X10(6)UL
SODIUM SERPL-SCNC: 142 MMOL/L (ref 136–145)
TSI SER-ACNC: 0.92 MIU/ML (ref 0.36–3.74)
VIT B12 SERPL-MCNC: 310 PG/ML (ref 193–986)
WBC # BLD AUTO: 4.7 X10(3) UL (ref 4–11)

## 2023-09-07 PROCEDURE — 99214 OFFICE O/P EST MOD 30 MIN: CPT | Performed by: FAMILY MEDICINE

## 2023-09-07 PROCEDURE — 3075F SYST BP GE 130 - 139MM HG: CPT | Performed by: FAMILY MEDICINE

## 2023-09-07 PROCEDURE — 3079F DIAST BP 80-89 MM HG: CPT | Performed by: FAMILY MEDICINE

## 2023-09-07 PROCEDURE — 80050 GENERAL HEALTH PANEL: CPT | Performed by: FAMILY MEDICINE

## 2023-09-07 PROCEDURE — 3008F BODY MASS INDEX DOCD: CPT | Performed by: FAMILY MEDICINE

## 2023-09-07 PROCEDURE — 82607 VITAMIN B-12: CPT | Performed by: FAMILY MEDICINE

## 2023-09-07 PROCEDURE — 87389 HIV-1 AG W/HIV-1&-2 AB AG IA: CPT | Performed by: FAMILY MEDICINE

## 2023-09-07 PROCEDURE — 83921 ORGANIC ACID SINGLE QUANT: CPT | Performed by: FAMILY MEDICINE

## 2023-09-07 RX ORDER — EMTRICITABINE AND TENOFOVIR DISOPROXIL FUMARATE 200; 300 MG/1; MG/1
1 TABLET, FILM COATED ORAL DAILY
Qty: 90 TABLET | Refills: 0 | Status: SHIPPED | OUTPATIENT
Start: 2023-09-07

## 2023-09-07 NOTE — ASSESSMENT & PLAN NOTE
Patient with intermittent episodes where he becomes slightly more confused  Also with concerns for slight personality changes. Does note that he has some increased stress in life with his mother being sick. Advised to monitor stress for now. Can check blood work including CBC, CMP, TSH, B12.  I will refer him to neurology for formal memory assessment at this time however.

## 2023-09-07 NOTE — PATIENT INSTRUCTIONS
PATIENT INSTRUCTIONS    Thank you for seeing me today, it was a pleasure taking care of you. Please check out at the  and schedule a follow up appointment. Return in about 3 months (around 12/7/2023) for follow up.   Can restart truvada  Continue losartan and rosuvastatin  Can see neurology for memory evaluation  Moisturize regularly with Cerave  If with itching can continue over the counter hydrocortisone     Best,  Dr. Gustavo Motley

## 2023-09-07 NOTE — ASSESSMENT & PLAN NOTE
Patient with intermittent dry rash in the inferior aspect of his orbit. Suspect some form of dermatitis as this is itchy  Advised moisturizing on a regular basis. Can use over-the-counter hydrocortisone as needed sparingly.

## 2023-09-13 LAB — METHYLMALONIC ACID: 378 NMOL/L

## 2023-09-21 DIAGNOSIS — I10 ESSENTIAL HYPERTENSION: Chronic | ICD-10-CM

## 2023-09-21 RX ORDER — LOSARTAN POTASSIUM 25 MG/1
25 TABLET ORAL DAILY
Qty: 90 TABLET | Refills: 0 | Status: SHIPPED | OUTPATIENT
Start: 2023-09-21

## 2023-10-26 DIAGNOSIS — I10 ESSENTIAL HYPERTENSION: Chronic | ICD-10-CM

## 2023-10-27 RX ORDER — LOSARTAN POTASSIUM 25 MG/1
25 TABLET ORAL DAILY
Qty: 90 TABLET | Refills: 0 | Status: SHIPPED | OUTPATIENT
Start: 2023-10-27

## 2023-12-05 DIAGNOSIS — Z91.89 AT RISK FOR SEXUALLY TRANSMITTED DISEASE DUE TO PARTNER WITH HIV: ICD-10-CM

## 2023-12-05 RX ORDER — EMTRICITABINE AND TENOFOVIR DISOPROXIL FUMARATE 200; 300 MG/1; MG/1
1 TABLET, FILM COATED ORAL DAILY
Qty: 90 TABLET | Refills: 0 | OUTPATIENT
Start: 2023-12-05

## 2023-12-21 ENCOUNTER — OFFICE VISIT (OUTPATIENT)
Dept: INTERNAL MEDICINE CLINIC | Facility: CLINIC | Age: 58
End: 2023-12-21
Payer: COMMERCIAL

## 2023-12-21 VITALS
OXYGEN SATURATION: 98 % | TEMPERATURE: 97 F | HEART RATE: 68 BPM | DIASTOLIC BLOOD PRESSURE: 72 MMHG | WEIGHT: 243 LBS | BODY MASS INDEX: 32.91 KG/M2 | SYSTOLIC BLOOD PRESSURE: 128 MMHG | HEIGHT: 72 IN

## 2023-12-21 DIAGNOSIS — B34.9 VIRAL SYNDROME: ICD-10-CM

## 2023-12-21 DIAGNOSIS — I10 ESSENTIAL HYPERTENSION: Chronic | ICD-10-CM

## 2023-12-21 DIAGNOSIS — L30.9 DERMATITIS: ICD-10-CM

## 2023-12-21 DIAGNOSIS — E78.5 HYPERLIPIDEMIA, UNSPECIFIED HYPERLIPIDEMIA TYPE: Chronic | ICD-10-CM

## 2023-12-21 DIAGNOSIS — R05.3 CHRONIC COUGH: ICD-10-CM

## 2023-12-21 DIAGNOSIS — R73.03 PREDIABETES: ICD-10-CM

## 2023-12-21 DIAGNOSIS — Z91.89 AT RISK FOR SEXUALLY TRANSMITTED DISEASE DUE TO PARTNER WITH HIV: Primary | ICD-10-CM

## 2023-12-21 DIAGNOSIS — R41.0 CONFUSION: ICD-10-CM

## 2023-12-21 LAB
ALBUMIN SERPL-MCNC: 4.5 G/DL (ref 3.2–4.8)
ALBUMIN/GLOB SERPL: 1.5 {RATIO} (ref 1–2)
ALP LIVER SERPL-CCNC: 54 U/L
ALT SERPL-CCNC: 18 U/L
ANION GAP SERPL CALC-SCNC: 7 MMOL/L (ref 0–18)
AST SERPL-CCNC: 23 U/L (ref ?–34)
BILIRUB SERPL-MCNC: 0.5 MG/DL (ref 0.3–1.2)
BUN BLD-MCNC: 10 MG/DL (ref 9–23)
BUN/CREAT SERPL: 8.8 (ref 10–20)
CALCIUM BLD-MCNC: 9.5 MG/DL (ref 8.7–10.4)
CHLORIDE SERPL-SCNC: 105 MMOL/L (ref 98–112)
CO2 SERPL-SCNC: 28 MMOL/L (ref 21–32)
CREAT BLD-MCNC: 1.14 MG/DL
EGFRCR SERPLBLD CKD-EPI 2021: 75 ML/MIN/1.73M2 (ref 60–?)
FASTING STATUS PATIENT QL REPORTED: NO
GLOBULIN PLAS-MCNC: 3 G/DL (ref 2.8–4.4)
GLUCOSE BLD-MCNC: 86 MG/DL (ref 70–99)
OSMOLALITY SERPL CALC.SUM OF ELEC: 288 MOSM/KG (ref 275–295)
POTASSIUM SERPL-SCNC: 4.1 MMOL/L (ref 3.5–5.1)
PROT SERPL-MCNC: 7.5 G/DL (ref 5.7–8.2)
SODIUM SERPL-SCNC: 140 MMOL/L (ref 136–145)

## 2023-12-21 PROCEDURE — 99214 OFFICE O/P EST MOD 30 MIN: CPT | Performed by: FAMILY MEDICINE

## 2023-12-21 PROCEDURE — 3008F BODY MASS INDEX DOCD: CPT | Performed by: FAMILY MEDICINE

## 2023-12-21 PROCEDURE — 87389 HIV-1 AG W/HIV-1&-2 AB AG IA: CPT | Performed by: FAMILY MEDICINE

## 2023-12-21 PROCEDURE — 3078F DIAST BP <80 MM HG: CPT | Performed by: FAMILY MEDICINE

## 2023-12-21 PROCEDURE — 3074F SYST BP LT 130 MM HG: CPT | Performed by: FAMILY MEDICINE

## 2023-12-21 PROCEDURE — 80053 COMPREHEN METABOLIC PANEL: CPT | Performed by: FAMILY MEDICINE

## 2023-12-21 RX ORDER — EMTRICITABINE AND TENOFOVIR DISOPROXIL FUMARATE 200; 300 MG/1; MG/1
1 TABLET, FILM COATED ORAL DAILY
Qty: 90 TABLET | Refills: 0 | Status: SHIPPED | OUTPATIENT
Start: 2023-12-21

## 2023-12-21 NOTE — ASSESSMENT & PLAN NOTE
Patient with intermittent episodes where he becomes slightly more confused  Also with concerns for slight personality changes. Does note that he has some increased stress in life with his mother being sick. Advised to monitor stress for now.     Referred to neurology for formal memory assessment

## 2023-12-21 NOTE — ASSESSMENT & PLAN NOTE
Patient with a chronic mild cough. Does have a history of seasonal allergies, however he reports that he is very aware when that typically flares. Potentially acid reflux related. Lifestyle modifications discussed  After current viral syndrome improves, can try omeprazole 20 mg daily over-the-counter  If with improvement can long-term use famotidine as needed. Follow-up as needed.

## 2023-12-21 NOTE — ASSESSMENT & PLAN NOTE
Patient with symptoms consistent with a recent viral syndrome. Symptoms seem very mild. Stay hydrated  Can use Tylenol Advil as needed  Follow-up as needed.

## 2023-12-21 NOTE — PATIENT INSTRUCTIONS
PATIENT INSTRUCTIONS    Thank you for seeing me today, it was a pleasure taking care of you. Please check out at the  and schedule a follow up appointment. Return in about 3 months (around 3/25/2024) for physical .  Please remember that the gregory period for all appointments is 5 minutes. This is to help maximize the amount of time that we can spend together at our visits. Acid reflux lifestyle modifications  When you are feeling better from your current cold cough symptoms, then try omeprazole 20 mg daily for 2 weeks   If the omeprazole helps, likely acid reflux is contributing to your chronic cough  Can moving forward use medication such as famotidine (pepcid)  Neurology  Tressa Mccracken,  [address] [facility] [phone]       Dr. Lianne Hodge Found

## 2024-04-17 DIAGNOSIS — I10 ESSENTIAL HYPERTENSION: Chronic | ICD-10-CM

## 2024-04-17 RX ORDER — LOSARTAN POTASSIUM 25 MG/1
25 TABLET ORAL DAILY
Qty: 90 TABLET | Refills: 0 | Status: SHIPPED | OUTPATIENT
Start: 2024-04-17

## 2024-04-18 DIAGNOSIS — Z91.89 AT RISK FOR SEXUALLY TRANSMITTED DISEASE DUE TO PARTNER WITH HIV: ICD-10-CM

## 2024-04-18 RX ORDER — EMTRICITABINE AND TENOFOVIR DISOPROXIL FUMARATE 200; 300 MG/1; MG/1
1 TABLET, FILM COATED ORAL DAILY
Qty: 90 TABLET | Refills: 0 | OUTPATIENT
Start: 2024-04-18

## 2024-04-18 NOTE — TELEPHONE ENCOUNTER
A refill request was received for:  Requested Prescriptions     Pending Prescriptions Disp Refills    EMTRICITABINE-TENOFOVIR 200-300 MG Oral Tab [Pharmacy Med Name: EMTRICITABINE/TENOF 200-300MG TABS] 90 tablet 0     Sig: TAKE 1 TABLET BY MOUTH DAILY     Last refill date:  12/21/23    Last office visit: 12/21/23      Future Appointments   Date Time Provider Department Center   5/11/2024  8:20 AM Dorian Martínez MD [facility] [address]

## 2024-05-11 ENCOUNTER — OFFICE VISIT (OUTPATIENT)
Dept: INTERNAL MEDICINE CLINIC | Facility: CLINIC | Age: 59
End: 2024-05-11
Payer: COMMERCIAL

## 2024-05-11 VITALS
DIASTOLIC BLOOD PRESSURE: 86 MMHG | WEIGHT: 244 LBS | OXYGEN SATURATION: 97 % | HEART RATE: 74 BPM | SYSTOLIC BLOOD PRESSURE: 132 MMHG | HEIGHT: 72 IN | BODY MASS INDEX: 33.05 KG/M2

## 2024-05-11 DIAGNOSIS — G47.09 OTHER INSOMNIA: ICD-10-CM

## 2024-05-11 DIAGNOSIS — E66.09 CLASS 1 OBESITY DUE TO EXCESS CALORIES WITH SERIOUS COMORBIDITY AND BODY MASS INDEX (BMI) OF 33.0 TO 33.9 IN ADULT: ICD-10-CM

## 2024-05-11 DIAGNOSIS — B34.9 VIRAL SYNDROME: ICD-10-CM

## 2024-05-11 DIAGNOSIS — D22.9 NUMEROUS MOLES: ICD-10-CM

## 2024-05-11 DIAGNOSIS — I10 ESSENTIAL HYPERTENSION: Chronic | ICD-10-CM

## 2024-05-11 DIAGNOSIS — D18.01 CHERRY HEMANGIOMA: ICD-10-CM

## 2024-05-11 DIAGNOSIS — L30.9 DERMATITIS: ICD-10-CM

## 2024-05-11 DIAGNOSIS — J30.2 SEASONAL ALLERGIES: ICD-10-CM

## 2024-05-11 DIAGNOSIS — R05.3 CHRONIC COUGH: ICD-10-CM

## 2024-05-11 DIAGNOSIS — Z91.89 AT RISK FOR SEXUALLY TRANSMITTED DISEASE DUE TO PARTNER WITH HIV: ICD-10-CM

## 2024-05-11 DIAGNOSIS — L82.1 SEBORRHEIC KERATOSIS: ICD-10-CM

## 2024-05-11 DIAGNOSIS — Z00.00 HEALTH MAINTENANCE EXAMINATION: Primary | ICD-10-CM

## 2024-05-11 DIAGNOSIS — R73.03 PREDIABETES: ICD-10-CM

## 2024-05-11 DIAGNOSIS — E78.5 HYPERLIPIDEMIA, UNSPECIFIED HYPERLIPIDEMIA TYPE: Chronic | ICD-10-CM

## 2024-05-11 DIAGNOSIS — R41.0 CONFUSION: ICD-10-CM

## 2024-05-11 LAB
ALBUMIN SERPL-MCNC: 4.3 G/DL (ref 3.2–4.8)
ALBUMIN/GLOB SERPL: 1.5 {RATIO} (ref 1–2)
ALP LIVER SERPL-CCNC: 49 U/L
ALT SERPL-CCNC: 24 U/L
ANION GAP SERPL CALC-SCNC: 7 MMOL/L (ref 0–18)
AST SERPL-CCNC: 22 U/L (ref ?–34)
BILIRUB SERPL-MCNC: 1.1 MG/DL (ref 0.3–1.2)
BUN BLD-MCNC: 10 MG/DL (ref 9–23)
BUN/CREAT SERPL: 7.9 (ref 10–20)
CALCIUM BLD-MCNC: 9.8 MG/DL (ref 8.7–10.4)
CHLORIDE SERPL-SCNC: 107 MMOL/L (ref 98–112)
CHOLEST SERPL-MCNC: 138 MG/DL (ref ?–200)
CO2 SERPL-SCNC: 29 MMOL/L (ref 21–32)
CREAT BLD-MCNC: 1.27 MG/DL
EGFRCR SERPLBLD CKD-EPI 2021: 66 ML/MIN/1.73M2 (ref 60–?)
EST. AVERAGE GLUCOSE BLD GHB EST-MCNC: 131 MG/DL (ref 68–126)
FASTING PATIENT LIPID ANSWER: YES
FASTING STATUS PATIENT QL REPORTED: YES
GLOBULIN PLAS-MCNC: 2.8 G/DL (ref 2–3.5)
GLUCOSE BLD-MCNC: 113 MG/DL (ref 70–99)
HBA1C MFR BLD: 6.2 % (ref ?–5.7)
HDLC SERPL-MCNC: 38 MG/DL (ref 40–59)
LDLC SERPL CALC-MCNC: 78 MG/DL (ref ?–100)
NONHDLC SERPL-MCNC: 100 MG/DL (ref ?–130)
OSMOLALITY SERPL CALC.SUM OF ELEC: 296 MOSM/KG (ref 275–295)
POTASSIUM SERPL-SCNC: 4.8 MMOL/L (ref 3.5–5.1)
PROT SERPL-MCNC: 7.1 G/DL (ref 5.7–8.2)
SODIUM SERPL-SCNC: 143 MMOL/L (ref 136–145)
TRIGL SERPL-MCNC: 120 MG/DL (ref 30–149)
VIT B12 SERPL-MCNC: 1189 PG/ML (ref 211–911)
VLDLC SERPL CALC-MCNC: 19 MG/DL (ref 0–30)

## 2024-05-11 PROCEDURE — 80061 LIPID PANEL: CPT | Performed by: FAMILY MEDICINE

## 2024-05-11 PROCEDURE — 83036 HEMOGLOBIN GLYCOSYLATED A1C: CPT | Performed by: FAMILY MEDICINE

## 2024-05-11 PROCEDURE — 87389 HIV-1 AG W/HIV-1&-2 AB AG IA: CPT | Performed by: FAMILY MEDICINE

## 2024-05-11 PROCEDURE — 82607 VITAMIN B-12: CPT | Performed by: FAMILY MEDICINE

## 2024-05-11 PROCEDURE — 80053 COMPREHEN METABOLIC PANEL: CPT | Performed by: FAMILY MEDICINE

## 2024-05-11 RX ORDER — EMTRICITABINE AND TENOFOVIR DISOPROXIL FUMARATE 200; 300 MG/1; MG/1
1 TABLET, FILM COATED ORAL DAILY
Qty: 90 TABLET | Refills: 0 | Status: SHIPPED | OUTPATIENT
Start: 2024-05-11

## 2024-05-11 RX ORDER — ZOLPIDEM TARTRATE 5 MG/1
5 TABLET ORAL NIGHTLY PRN
Qty: 10 TABLET | Refills: 0 | Status: SHIPPED | OUTPATIENT
Start: 2024-05-11

## 2024-05-11 NOTE — ASSESSMENT & PLAN NOTE
Patient with intermittent episodes where he becomes slightly more confused  Also with concerns for slight personality changes.  Did have recent stress in life from mother's illness, recently mother also just passed away.   Advised to monitor stress for now.    Referred to neurology for formal memory assessment

## 2024-05-11 NOTE — PATIENT INSTRUCTIONS
PATIENT INSTRUCTIONS    Thank you for seeing me today, it was a pleasure taking care of you.  Please check out at the  and schedule a follow up appointment.  Return in about 3 months (around 8/11/2024) for follow up.  Please remember that the gregory period for all appointments is 5 minutes. This is to help maximize the amount of time that we can spend together at our visits.    Please get your labs drawn - you may need to schedule a lab appointment if this was not completed at your recent doctor's visit.  The following imaging studies were ordered: None  Please also follow up with the following specialists: Neurology  Please fill out the advance directive information (power of  documents) and bring a copy to our clinic.            Dr. Mauricio Hodge

## 2024-05-11 NOTE — ASSESSMENT & PLAN NOTE
Patient with a chronic mild cough.  Does have a history of seasonal allergies, however he reports that he is very aware when that typically flares.  Potentially acid reflux related.  Lifestyle modifications discussed  Advised trial of omeprazole 20 mg daily over-the-counter  If with improvement can long-term use famotidine as needed.  Follow-up as needed.

## 2024-05-11 NOTE — ASSESSMENT & PLAN NOTE
Exercise and diet advised.  CBC, CMP, lipid panel, Hba1c, HIV screen,  Advanced directive information provided.  Advised COVID vaccine.

## 2024-05-11 NOTE — PROGRESS NOTES
FAMILY MEDICINE CLINIC NOTE    HPI  Yasir Burkett is a 58 year old male presenting for physical    #Health Maintenance  -Diet: Good. Varied diet. Freshly prepared meals.   -Exercise: Very little.   -Lung cancer screen: Not indicated  -Colon cancer screen: - 10/2019 Dr. Stevens, repeat 2029.  -Prostate cancer screen: - 3/2023  -Aortic aneurysm screen: Not indicated  -Statin:  - 5/2022 last lipid panel. On rosuvastatin 20 mg nightly  -ASA: Not indicated  -HIV screen: - Indicated  -Hep C screen: 3/2023  -Gonorrhea/chlamydia:  Not indicated  -Syphillis: Not indicated  -TB: Not indicated  -Tobacco/alcohol: Per below  -Depression: PHQ-2 score of 0 (score >/= 3 do PHQ-9)  -Advanced Directive: Indicated     #Immunizations  -Tdap: 2/2022  -Flu shot: 9/2022  -PCV13: Not indicated   -PCV20: Not indicated   -PPSV23: Not indicated   -HPV: Not indicated  -RZV (preferred) or VZL: 2/2022, 2/2023  -RSV: Not indicated  -COVID: Pfizer        #HIV risk  -truvada - needing refill     #HTN  -losartan 25 mg daily  -history of swelling with amlodipine and possible cough with lisinopril  -no issues currently     #Chronic cough  -persistent cough for a few years  -eating certain foods - coughing spasm   -coffee, spicy foods, chocolate  -different from allergy symptoms   -has not started omeprazole     #Recent cough---resolved  -lately with dry cough and congested though that is different from his typical cough  -sick contact - friend's daughter visiting was coughing  -reports recovered from recent illness     #Seasonal allergies  -typically cetirizine    #Dermatitis around eye  -gets crusty and \"raw\"  -somewhat itchy  -no pain  -has seen dermatology   -hydrocortisone otc - seems to help   -not moisturizing      #Confusion  -reports general confusion and clumsiness  -bumps into things and knocks things over  -not sure if this is a fog - example - was driving at interaction with green light and started slowing down   -noticing this for a  couple of years  -change in personality as well - less like himself  -notes stress - mother cancer, recently passed away  -referred to neurology      #HLD  -rosuvastatin 10 mg nightly      #Prediabetes  -A1c 6.2 3/2023     #Ear lesion-removed, resolved  - dermatology Dr Goldberg  -compound nevous - moderate atypia      #SK  #Cherry angiomas  #Numerous moles  -on face and arms and back     #Insomnia  -zolpidem 5mg PRN   -used it when flying, very rare    #Patient Care Team  Patient Care Team:  Dorian Martínez MD as PCP - General (Family Medicine)    ROS  GENERAL: No fever/chills, no recent weight loss   HEENT: No visual changes, no changes in hearing, no sore throats  NECK: No pain, no swelling  RESP: No cough, no SOB  CV: No chest pain, no palpitations  GI: No abd pain, no N/V/D  MSK: No edema, no pain  SKIN: No new rashes  NEURO: No numbness, no tingling, no HA    HEALTH MAINTENANCE CHECKLIST  Health Maintenance Topics with due status: Overdue       Topic Date Due    Annual Depression Screening 01/01/2024    Annual Physical 03/24/2024       ALLERGIES  Allergies   Allergen Reactions    Radiology Contrast Iodinated Dyes SWELLING and FACE FLUSHING    Sulfa Antibiotics HIVES       MEDICATIONS  Current Outpatient Medications   Medication Sig Dispense Refill    emtricitabine-tenofovir (TRUVADA) 200-300 MG Oral Tab Take 1 tablet by mouth daily. 90 tablet 0    zolpidem 5 MG Oral Tab Take 1 tablet (5 mg total) by mouth nightly as needed for Sleep. 10 tablet 0    losartan 25 MG Oral Tab Take 1 tablet (25 mg total) by mouth daily. 90 tablet 0    rosuvastatin 10 MG Oral Tab Take 1 tablet (10 mg total) by mouth nightly. 90 tablet 3       ACTIVE PROBLEM  Patient Active Problem List   Diagnosis    Essential hypertension    Hyperlipidemia    Prediabetes    Health maintenance examination    Class 1 obesity due to excess calories with serious comorbidity and body mass index (BMI) of 33.0 to 33.9 in adult    Seborrheic keratosis     [place] hemangioma    Numerous moles    Other insomnia    At risk for sexually transmitted disease due to partner with HIV    Seasonal allergies    Confusion    Dermatitis    Chronic cough       PAST MEDICAL HISTORY  Past Medical History:    Calculus of kidney    25 years ago    Diverticulosis of large intestine    Essential hypertension    Hyperlipidemia    Inguinal hernia    PONV (postoperative nausea and vomiting)    back in 2003    Rectus diastasis    Visual impairment    reading glasses       PAST SOCIAL HISTORY  Social History     Socioeconomic History    Marital status:      Spouse name: Not on file    Number of children: Not on file    Years of education: Not on file    Highest education level: Not on file   Occupational History    Not on file   Tobacco Use    Smoking status: Never    Smokeless tobacco: Never   Vaping Use    Vaping status: Never Used   Substance and Sexual Activity    Alcohol use: Yes     Comment: social once monthly    Drug use: No    Sexual activity: Not Currently     Partners: Male   Other Topics Concern    Not on file   Social History Narrative    Relationships:  - Manpreet Argueta (has HIV history).    Children: None    Pets: Fish    School: N/A    Work: Accounting, finance - [employer]    Origin: [place]     Interests: Enjoys traveling. Movies - dramas, comedies. Reading - fiction, historical. Enjoys baking.    Spiritual: Not Sikhism, not spiritual     Social Determinants of Health     Financial Resource Strain: Not on file   Food Insecurity: Not on file   Transportation Needs: Not on file   Physical Activity: Not on file   Stress: Not on file   Social Connections: Not on file   Housing Stability: Not on file       PAST SURGICAL HISTORY  Past Surgical History:   Procedure Laterality Date    Colonoscopy N/A 10/4/2019    Procedure: COLONOSCOPY;  Surgeon: John Stevens MD;  Location: [facility] ENDOSCOPY    Hernia surgery Left 1996    Hernia  surgery Right 2002    Hernia surgery  2019    repair     Laparoscopic appendectomy N/A 10/16/2019    Ventral hernia repair  10/19/2020       PAST FAMILY HISTORY  Family History   Problem Relation Age of Onset    Hyperlipidemia Mother     Cancer Mother         Pancreatic and liver    Alcohol and Other Disorders Associated Father     No Known Problems Sister     Hypertension Sister     Hyperlipidemia Sister     Anxiety Sister     Depression Sister     Diabetes Maternal Grandmother     Obesity Maternal Grandmother     Hypertension Maternal Grandmother     No Known Problems Maternal Grandfather     Stroke Paternal Grandmother     No Known Problems Paternal Grandfather     Colon Cancer Neg     Prostate Cancer Neg        PHYSICAL EXAM  Vitals:    05/11/24 0803   BP: 132/86   Pulse: 74   SpO2: 97%   Weight: 244 lb (110.7 kg)   Height: 6' (1.829 m)      Body mass index is 33.09 kg/m².    GENERAL: NAD  HEENT: Moist mucous membranes, no tonsillar swelling or erythema, PERRLA bilat, TM translucent and non-bulging  NECK: Supple, non-tender  RESP: CTAB, no wheezing, no rales, no ronchi  CV: RRR, no murmurs  GI: Soft, non-distended, non-tender, no guarding, no rebound, no masses  MSK: No edema  SKIN: Warm and dry, stuck on appearing rough papule seen on the right forearm.   NEURO: Answering questions appropriately    LABS  Lab Results   Component Value Date    WBC 4.7 09/07/2023    HGB 15.3 09/07/2023    HCT 47.0 09/07/2023    .0 09/07/2023    NEPERCENT 65.1 09/07/2023    LYPERCENT 25.3 09/07/2023    MOPERCENT 7.7 09/07/2023    EOPERCENT 1.1 09/07/2023    BAPERCENT 0.6 09/07/2023    NE 3.06 09/07/2023    LYMABS 1.19 09/07/2023    MOABSO 0.36 09/07/2023    EOABSO 0.05 09/07/2023    BAABSO 0.03 09/07/2023       Lab Results   Component Value Date     12/21/2023    K 4.1 12/21/2023     12/21/2023    CO2 28.0 12/21/2023    ANIONGAP 7 12/21/2023    BUN 10 12/21/2023    CREATSERUM 1.14 12/21/2023    BUNCREA 8.8 (L)  12/21/2023    GLU 86 12/21/2023    CA 9.5 12/21/2023    OSMOCALC 288 12/21/2023    GFRNAA 70 12/18/2021    GFRAA 81 12/18/2021    ALT 18 12/21/2023    AST 23 12/21/2023    ALKPHO 54 12/21/2023    BILT 0.5 12/21/2023    TP 7.5 12/21/2023    ALB 4.5 12/21/2023    GLOBULIN 3.0 12/21/2023    ELECTAG 1.5 12/21/2023    FASTING No 12/21/2023         Lab Results   Component Value Date    CHOLEST 145 03/24/2023    TRIG 125 03/24/2023    HDL 43 03/24/2023    LDL 80 03/24/2023    VLDL 20 03/24/2023    NONHDLC 102 03/24/2023        DIAGNOSTICS    ASSESSMENT/PLAN  Problem List Items Addressed This Visit          Cardiac and Vasculature    Essential hypertension (Chronic)     Blood pressures controlled.  Continue losartan 25 mg daily.         Relevant Orders    Comp Metabolic Panel (14)    Hyperlipidemia (Chronic)     Continue rosuvastatin 10 mg daily  Check CMP and lipid panel         Relevant Orders    Comp Metabolic Panel (14)    Lipid Panel       Pulmonary and Pneumonias    Chronic cough     Patient with a chronic mild cough.  Does have a history of seasonal allergies, however he reports that he is very aware when that typically flares.  Potentially acid reflux related.  Lifestyle modifications discussed  Advised trial of omeprazole 20 mg daily over-the-counter  If with improvement can long-term use famotidine as needed.  Follow-up as needed.            Endocrine and Metabolic    Class 1 obesity due to excess calories with serious comorbidity and body mass index (BMI) of 33.0 to 33.9 in adult     Healthy diet and exercise advised.  Check labs         Relevant Orders    Lipid Panel    Hemoglobin A1C    Prediabetes     The diet and exercise advised.    Monitor hemoglobin A1c          Relevant Orders    Hemoglobin A1C       Neuro    Confusion     Patient with intermittent episodes where he becomes slightly more confused  Also with concerns for slight personality changes.  Did have recent stress in life from mother's illness,  recently mother also just passed away.   Advised to monitor stress for now.    Referred to neurology for formal memory assessment         Relevant Medications    zolpidem 5 MG Oral Tab    Other Relevant Orders    NEURO - INTERNAL       Infectious Diseases    At risk for sexually transmitted disease due to partner with HIV     Continue Truvada.  Check CMP HIV screen.  Follow-up every 3 months for management.         Relevant Medications    emtricitabine-tenofovir (TRUVADA) 200-300 MG Oral Tab    Other Relevant Orders    HIV Ag/Ab Combo    Comp Metabolic Panel (14)    RESOLVED: Viral syndrome     Resolved.          Relevant Medications    emtricitabine-tenofovir (TRUVADA) 200-300 MG Oral Tab       EarNoseThroat    Seasonal allergies     Can continue use cetirizine and Flonase as needed.  Advised restarting            Skin    Cherry hemangioma     Multiple cherry hemangiomas throughout the body.  Can monitor.         Dermatitis     Patient with intermittent dry rash in the inferior aspect of his orbit.  Suspect some form of dermatitis as this is itchy  Advised moisturizing on a regular basis.  Can use over-the-counter hydrocortisone as needed sparingly.         Numerous moles     History of numerous moles.  Can continue to follow with dermatology.         Seborrheic keratosis     Multiple seborrheic keratosis throughout the body.  Can monitor.            Sleep    Other insomnia     Mostly associated with travel.  Can use zolpidem as needed when traveling - advised not to use on a regular basis (he does not).         Relevant Medications    zolpidem 5 MG Oral Tab       Health Encounters    Health maintenance examination - Primary (Chronic)     Exercise and diet advised.  CBC, CMP, lipid panel, Hba1c, HIV screen,  Advanced directive information provided.  Advised COVID vaccine.         Relevant Orders    HIV Ag/Ab Combo    Lipid Panel    Hemoglobin A1C    Vitamin B12 [E]       Return in about 3 months (around 8/11/2024)  for follow up.    Dorian Martínez MD  Family Medicine

## 2024-08-10 ENCOUNTER — OFFICE VISIT (OUTPATIENT)
Dept: INTERNAL MEDICINE CLINIC | Facility: CLINIC | Age: 59
End: 2024-08-10
Payer: COMMERCIAL

## 2024-08-10 VITALS
OXYGEN SATURATION: 97 % | WEIGHT: 242 LBS | HEIGHT: 72 IN | BODY MASS INDEX: 32.78 KG/M2 | SYSTOLIC BLOOD PRESSURE: 132 MMHG | DIASTOLIC BLOOD PRESSURE: 76 MMHG | HEART RATE: 84 BPM | TEMPERATURE: 98 F

## 2024-08-10 DIAGNOSIS — Z91.89 AT RISK FOR SEXUALLY TRANSMITTED DISEASE DUE TO PARTNER WITH HIV: ICD-10-CM

## 2024-08-10 DIAGNOSIS — R41.0 CONFUSION: ICD-10-CM

## 2024-08-10 DIAGNOSIS — J30.2 SEASONAL ALLERGIES: ICD-10-CM

## 2024-08-10 DIAGNOSIS — R05.3 CHRONIC COUGH: Primary | ICD-10-CM

## 2024-08-10 DIAGNOSIS — I10 ESSENTIAL HYPERTENSION: Chronic | ICD-10-CM

## 2024-08-10 LAB
ALBUMIN SERPL-MCNC: 4.4 G/DL (ref 3.2–4.8)
ALBUMIN/GLOB SERPL: 1.6 {RATIO} (ref 1–2)
ALP LIVER SERPL-CCNC: 52 U/L
ALT SERPL-CCNC: 25 U/L
ANION GAP SERPL CALC-SCNC: 6 MMOL/L (ref 0–18)
AST SERPL-CCNC: 23 U/L (ref ?–34)
BILIRUB SERPL-MCNC: 1.1 MG/DL (ref 0.3–1.2)
BUN BLD-MCNC: 10 MG/DL (ref 9–23)
BUN/CREAT SERPL: 7.6 (ref 10–20)
CALCIUM BLD-MCNC: 9.8 MG/DL (ref 8.7–10.4)
CHLORIDE SERPL-SCNC: 108 MMOL/L (ref 98–112)
CO2 SERPL-SCNC: 28 MMOL/L (ref 21–32)
CREAT BLD-MCNC: 1.31 MG/DL
EGFRCR SERPLBLD CKD-EPI 2021: 63 ML/MIN/1.73M2 (ref 60–?)
FASTING STATUS PATIENT QL REPORTED: YES
GLOBULIN PLAS-MCNC: 2.7 G/DL (ref 2–3.5)
GLUCOSE BLD-MCNC: 122 MG/DL (ref 70–99)
OSMOLALITY SERPL CALC.SUM OF ELEC: 294 MOSM/KG (ref 275–295)
POTASSIUM SERPL-SCNC: 5.1 MMOL/L (ref 3.5–5.1)
PROT SERPL-MCNC: 7.1 G/DL (ref 5.7–8.2)
SODIUM SERPL-SCNC: 142 MMOL/L (ref 136–145)

## 2024-08-10 PROCEDURE — G2211 COMPLEX E/M VISIT ADD ON: HCPCS | Performed by: FAMILY MEDICINE

## 2024-08-10 PROCEDURE — 99214 OFFICE O/P EST MOD 30 MIN: CPT | Performed by: FAMILY MEDICINE

## 2024-08-10 PROCEDURE — 87389 HIV-1 AG W/HIV-1&-2 AB AG IA: CPT | Performed by: FAMILY MEDICINE

## 2024-08-10 PROCEDURE — 80053 COMPREHEN METABOLIC PANEL: CPT | Performed by: FAMILY MEDICINE

## 2024-08-10 PROCEDURE — 3075F SYST BP GE 130 - 139MM HG: CPT | Performed by: FAMILY MEDICINE

## 2024-08-10 PROCEDURE — 3008F BODY MASS INDEX DOCD: CPT | Performed by: FAMILY MEDICINE

## 2024-08-10 PROCEDURE — 3078F DIAST BP <80 MM HG: CPT | Performed by: FAMILY MEDICINE

## 2024-08-10 RX ORDER — EMTRICITABINE AND TENOFOVIR DISOPROXIL FUMARATE 200; 300 MG/1; MG/1
1 TABLET, FILM COATED ORAL DAILY
Qty: 90 TABLET | Refills: 0 | Status: SHIPPED | OUTPATIENT
Start: 2024-08-10

## 2024-08-10 NOTE — ASSESSMENT & PLAN NOTE
Patient with intermittent episodes where he becomes slightly more confused  Also with concerns for slight personality changes.  Did have recent stress in life from mother's illness, recently mother also just passed away.   He has plans to see a therapist - encouraged to go  Will monitor mental health.   Previously referred to neurology for formal memory assessment - can go depending on how therapy goes.

## 2024-08-10 NOTE — PROGRESS NOTES
FAMILY MEDICINE CLINIC NOTE    HPI  Ysair Burkett is a 58 year old male presenting for       #HIV risk  -truvada - needing refill     #HTN  -losartan 25 mg daily  -history of swelling with amlodipine and possible cough with lisinopril  -no issues currently     #Chronic cough  5/2024  -persistent cough for a few years  -eating certain foods - coughing spasm   -coffee, spicy foods, chocolate  -different from allergy symptoms   8/2024  -has not started omeprazole      #Seasonal allergies  -typically cetirizine  -reports controlled    #Confusion  -reports general confusion and clumsiness  -bumps into things and knocks things over  -not sure if this is a fog - example - was driving at interaction with green light and started slowing down   -noticing this for a couple of years  -change in personality as well - less like himself  -notes stress - mother cancer, recently passed away  -denies depression  -referred to neurology   -has plans to see therapist    ---other    #Dermatitis around eye  -Dermatology Dr Julie Goldberg - eyelid dermatitis   -gets crusty and \"raw\"  -somewhat itchy  -no pain  -has seen dermatology   -hydrocortisone otc - seems to help   -not moisturizing      #HLD  -rosuvastatin 10 mg nightly   -lipid panel 5/2024     #Prediabetes  -A1c 6.2 5/2024     #Ear lesion-removed, resolved  - dermatology Dr Goldberg  -compound nevous - moderate atypia      #SK  #Cherry angiomas  #Numerous moles  -on face and arms and back     #Insomnia  -zolpidem 5mg PRN   -used it when flying, very rare           ROS  GENERAL: No fever/chills, no recent weight loss  HEENT: No visual changes, no changes in hearing, no sore throats  NECK: No pain, no swelling  RESP: No cough, no SOB  CV: No chest pain, no palpitations  GI: No abd pain, no N/V/D  MSK: No edema  SKIN: No new rashes  NEURO: No numbness, no tingling, no headaches    HEALTH MAINTENANCE  The patient has no Health Maintenance topics of status Overdue, Due On, or Due  Soon    ALLERGIES  Allergies   Allergen Reactions    Radiology Contrast Iodinated Dyes SWELLING and FACE FLUSHING    Sulfa Antibiotics HIVES       MEDICATIONS  Current Outpatient Medications   Medication Sig Dispense Refill    emtricitabine-tenofovir (TRUVADA) 200-300 MG Oral Tab Take 1 tablet by mouth daily. 90 tablet 0    zolpidem 5 MG Oral Tab Take 1 tablet (5 mg total) by mouth nightly as needed for Sleep. 10 tablet 0    losartan 25 MG Oral Tab Take 1 tablet (25 mg total) by mouth daily. 90 tablet 0    rosuvastatin 10 MG Oral Tab Take 1 tablet (10 mg total) by mouth nightly. 90 tablet 3       ACTIVE PROBLEMS  Patient Active Problem List   Diagnosis    Essential hypertension    Hyperlipidemia    Prediabetes    Health maintenance examination    Class 1 obesity due to excess calories with serious comorbidity and body mass index (BMI) of 33.0 to 33.9 in adult    Seborrheic keratosis    Cherry hemangioma    Numerous moles    Other insomnia    At risk for sexually transmitted disease due to partner with HIV    Seasonal allergies    Confusion    Dermatitis    Chronic cough       PAST MEDICAL HISTORY  Past Medical History:    Calculus of kidney    25 years ago    Diverticulosis of large intestine    Essential hypertension    Hyperlipidemia    Inguinal hernia    PONV (postoperative nausea and vomiting)    back in 2003    Rectus diastasis    Visual impairment    reading glasses       PAST SOCIAL HISTORY  Social History     Socioeconomic History    Marital status:      Spouse name: Not on file    Number of children: Not on file    Years of education: Not on file    Highest education level: Not on file   Occupational History    Not on file   Tobacco Use    Smoking status: Never    Smokeless tobacco: Never   Vaping Use    Vaping status: Never Used   Substance and Sexual Activity    Alcohol use: Yes     Comment: social once monthly    Drug use: No    Sexual activity: Not Currently     Partners: Male   Other Topics  Concern    Not on file   Social History Narrative    Relationships:  - Manpreet Argueta (has HIV history).    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  mix.     Interests: Enjoys traveling. Movies - dramas, comedies. Reading - fiction, historical. Enjoys baking.    Spiritual: Not Voodoo, not spiritual     Social Determinants of Health     Financial Resource Strain: Not on file   Food Insecurity: Not on file   Transportation Needs: Not on file   Physical Activity: Not on file   Stress: Not on file   Social Connections: Not on file   Housing Stability: Not on file       PAST SURGICAL HISTORY  Past Surgical History:   Procedure Laterality Date    Colonoscopy N/A 10/4/2019    Procedure: COLONOSCOPY;  Surgeon: John Stevens MD;  Location: [facility] ENDOSCOPY    Hernia surgery Left 1996    Hernia surgery Right 2002    Hernia surgery  2019    repair     Laparoscopic appendectomy N/A 10/16/2019    Ventral hernia repair  10/19/2020       PAST FAMILY HISTORY  Family History   Problem Relation Age of Onset    Hyperlipidemia Mother     Cancer Mother         Pancreatic and liver    Alcohol and Other Disorders Associated Father     No Known Problems Sister     Hypertension Sister     Hyperlipidemia Sister     Anxiety Sister     Depression Sister     Diabetes Maternal Grandmother     Obesity Maternal Grandmother     Hypertension Maternal Grandmother     No Known Problems Maternal Grandfather     Stroke Paternal Grandmother     No Known Problems Paternal Grandfather     Colon Cancer Neg     Prostate Cancer Neg          PHYSICAL EXAM  Vitals:    08/10/24 0811   BP: 132/76   Pulse: 84   Temp: 98 °F (36.7 °C)   SpO2: 97%   Weight: 242 lb (109.8 kg)   Height: 6' (1.829 m)      Body mass index is 32.82 kg/m².    GENERAL: NAD  RESP: Non-labored respirations, CTAB, no wheezing, no rales, no ronchi  CV: RRR, no murmurs  MSK: No  edema  NEURO: Answering questions appropriately    LABS  Lab Results   Component Value Date    WBC 4.7 09/07/2023    HGB 15.3 09/07/2023    HCT 47.0 09/07/2023    .0 09/07/2023    NEPERCENT 65.1 09/07/2023    LYPERCENT 25.3 09/07/2023    MOPERCENT 7.7 09/07/2023    EOPERCENT 1.1 09/07/2023    BAPERCENT 0.6 09/07/2023    NE 3.06 09/07/2023    LYMABS 1.19 09/07/2023    MOABSO 0.36 09/07/2023    EOABSO 0.05 09/07/2023    BAABSO 0.03 09/07/2023       Lab Results   Component Value Date     05/11/2024    K 4.8 05/11/2024     05/11/2024    CO2 29.0 05/11/2024    ANIONGAP 7 05/11/2024    BUN 10 05/11/2024    CREATSERUM 1.27 05/11/2024    BUNCREA 7.9 (L) 05/11/2024     (H) 05/11/2024    CA 9.8 05/11/2024    OSMOCALC 296 (H) 05/11/2024    GFRNAA 70 12/18/2021    GFRAA 81 12/18/2021    ALT 24 05/11/2024    AST 22 05/11/2024    ALKPHO 49 05/11/2024    BILT 1.1 05/11/2024    TP 7.1 05/11/2024    ALB 4.3 05/11/2024    GLOBULIN 2.8 05/11/2024    ELECTAG 1.5 05/11/2024    FASTING Yes 05/11/2024    FASTING Yes 05/11/2024         Lab Results   Component Value Date    CHOLEST 138 05/11/2024    TRIG 120 05/11/2024    HDL 38 (L) 05/11/2024    LDL 78 05/11/2024    VLDL 19 05/11/2024    NONHDLC 100 05/11/2024        DIAGNOSTICS      ASSESSMENT/PLAN  Problem List Items Addressed This Visit          Cardiac and Vasculature    Essential hypertension (Chronic)     Blood pressures controlled.  Continue losartan 25 mg daily.            Pulmonary and Pneumonias    Chronic cough - Primary     Patient with a chronic mild cough.  Does have a history of seasonal allergies, however he reports that he is very aware when that typically flares.  Potentially acid reflux related.  Lifestyle modifications discussed previously  Since the last visit, he has not tried the omeprazole-advise trying for now  Advised trial of omeprazole 20 mg daily over-the-counter  If with improvement can long-term use famotidine as needed.  Given  chronicity will also order chest x-ray at this time  Follow-up as needed.         Relevant Orders    XR CHEST PA + LAT CHEST (GLR=[id number])       Neuro    Confusion     Patient with intermittent episodes where he becomes slightly more confused  Also with concerns for slight personality changes.  Did have recent stress in life from mother's illness, recently mother also just passed away.   He has plans to see a therapist - encouraged to go  Will monitor mental health.   Previously referred to neurology for formal memory assessment - can go depending on how therapy goes.             Infectious Diseases    At risk for sexually transmitted disease due to partner with HIV     Continue Truvada.  Check CMP HIV screen.  Follow-up every 3 months for management.         Relevant Medications    emtricitabine-tenofovir (TRUVADA) 200-300 MG Oral Tab    Other Relevant Orders    HIV Ag/Ab Combo    Comp Metabolic Panel (14)       EarNoseThroat    Seasonal allergies     Can continue use cetirizine and Flonase as needed.            Return in about 3 months (around 11/10/2024) for follow up.    No topic due editable text     Dorian Martínez MD  Family Medicine           Pre-chartin minutes  Reviewing/obtainin minutes  Medical Exam:1 minutes  Counseling/education: 5 minutes  Notes: 5 minutes  Referring/communicatin minutes  Care coordination: 0 minutes    My total time spent caring for the patient on the day of the encounter: 18 minutes

## 2024-08-10 NOTE — PATIENT INSTRUCTIONS
PATIENT INSTRUCTIONS    Thank you for seeing me today, it was a pleasure taking care of you.  Please check out at the  and schedule a follow up appointment.  Return in about 3 months (around 11/10/2024) for follow up.  Please remember that the gregory period for all appointments is 5 minutes. This is to help maximize the amount of time that we can spend together at our visits.    Chest x-ray  Please call [phone] to schedule your imaging appointment.   Do try a trial of omeprazole 20 mg daily - acid reflux  See a thearpist, if desiring other referrals can let me know     Best,  Dr. Martínez

## 2024-08-10 NOTE — ASSESSMENT & PLAN NOTE
Patient with a chronic mild cough.  Does have a history of seasonal allergies, however he reports that he is very aware when that typically flares.  Potentially acid reflux related.  Lifestyle modifications discussed previously  Since the last visit, he has not tried the omeprazole-advise trying for now  Advised trial of omeprazole 20 mg daily over-the-counter  If with improvement can long-term use famotidine as needed.  Given chronicity will also order chest x-ray at this time  Follow-up as needed.

## 2024-09-11 DIAGNOSIS — Z91.89 AT RISK FOR SEXUALLY TRANSMITTED DISEASE DUE TO PARTNER WITH HIV: ICD-10-CM

## 2024-09-11 DIAGNOSIS — I10 ESSENTIAL HYPERTENSION: Chronic | ICD-10-CM

## 2024-09-11 DIAGNOSIS — E78.5 HYPERLIPIDEMIA, UNSPECIFIED HYPERLIPIDEMIA TYPE: Chronic | ICD-10-CM

## 2024-09-11 RX ORDER — ROSUVASTATIN CALCIUM 10 MG/1
10 TABLET, COATED ORAL NIGHTLY
Qty: 90 TABLET | Refills: 3 | Status: SHIPPED | OUTPATIENT
Start: 2024-09-11

## 2024-09-11 RX ORDER — EMTRICITABINE AND TENOFOVIR DISOPROXIL FUMARATE 200; 300 MG/1; MG/1
1 TABLET, FILM COATED ORAL DAILY
Qty: 90 TABLET | Refills: 0 | OUTPATIENT
Start: 2024-09-11

## 2024-09-11 RX ORDER — LOSARTAN POTASSIUM 25 MG/1
25 TABLET ORAL DAILY
Qty: 90 TABLET | Refills: 0 | Status: SHIPPED | OUTPATIENT
Start: 2024-09-11

## 2024-09-13 ENCOUNTER — HOSPITAL ENCOUNTER (OUTPATIENT)
Dept: GENERAL RADIOLOGY | Age: 59
Discharge: HOME | End: 2024-09-13
Attending: FAMILY MEDICINE
Payer: COMMERCIAL

## 2024-09-13 DIAGNOSIS — R05.3 CHRONIC COUGH: ICD-10-CM

## 2024-09-13 PROCEDURE — 71046 X-RAY EXAM CHEST 2 VIEWS: CPT | Performed by: FAMILY MEDICINE

## 2024-11-14 ENCOUNTER — OFFICE VISIT (OUTPATIENT)
Dept: INTERNAL MEDICINE CLINIC | Facility: CLINIC | Age: 59
End: 2024-11-14
Payer: COMMERCIAL

## 2024-11-14 VITALS
SYSTOLIC BLOOD PRESSURE: 144 MMHG | WEIGHT: 247 LBS | HEIGHT: 72 IN | BODY MASS INDEX: 33.46 KG/M2 | OXYGEN SATURATION: 98 % | TEMPERATURE: 98 F | DIASTOLIC BLOOD PRESSURE: 90 MMHG | HEART RATE: 80 BPM

## 2024-11-14 DIAGNOSIS — I10 ESSENTIAL HYPERTENSION: Chronic | ICD-10-CM

## 2024-11-14 DIAGNOSIS — M22.2X1 PATELLOFEMORAL PAIN SYNDROME OF BOTH KNEES: ICD-10-CM

## 2024-11-14 DIAGNOSIS — M22.2X2 PATELLOFEMORAL PAIN SYNDROME OF BOTH KNEES: ICD-10-CM

## 2024-11-14 DIAGNOSIS — M72.2 PLANTAR FASCIITIS: ICD-10-CM

## 2024-11-14 DIAGNOSIS — Z91.89 AT RISK FOR SEXUALLY TRANSMITTED DISEASE DUE TO PARTNER WITH HIV: ICD-10-CM

## 2024-11-14 DIAGNOSIS — R05.3 CHRONIC COUGH: Primary | ICD-10-CM

## 2024-11-14 DIAGNOSIS — J30.2 SEASONAL ALLERGIES: ICD-10-CM

## 2024-11-14 DIAGNOSIS — E78.5 HYPERLIPIDEMIA, UNSPECIFIED HYPERLIPIDEMIA TYPE: Chronic | ICD-10-CM

## 2024-11-14 LAB
ALBUMIN SERPL-MCNC: 4.7 G/DL (ref 3.2–4.8)
ALBUMIN/GLOB SERPL: 1.7 {RATIO} (ref 1–2)
ALP LIVER SERPL-CCNC: 55 U/L
ALT SERPL-CCNC: 23 U/L
ANION GAP SERPL CALC-SCNC: 7 MMOL/L (ref 0–18)
AST SERPL-CCNC: 22 U/L (ref ?–34)
BILIRUB SERPL-MCNC: 0.6 MG/DL (ref 0.3–1.2)
BUN BLD-MCNC: 9 MG/DL (ref 9–23)
BUN/CREAT SERPL: 7.5 (ref 10–20)
CALCIUM BLD-MCNC: 10.4 MG/DL (ref 8.7–10.4)
CHLORIDE SERPL-SCNC: 110 MMOL/L (ref 98–112)
CO2 SERPL-SCNC: 27 MMOL/L (ref 21–32)
CREAT BLD-MCNC: 1.2 MG/DL
EGFRCR SERPLBLD CKD-EPI 2021: 71 ML/MIN/1.73M2 (ref 60–?)
FASTING STATUS PATIENT QL REPORTED: NO
GLOBULIN PLAS-MCNC: 2.8 G/DL (ref 2–3.5)
GLUCOSE BLD-MCNC: 145 MG/DL (ref 70–99)
OSMOLALITY SERPL CALC.SUM OF ELEC: 299 MOSM/KG (ref 275–295)
POTASSIUM SERPL-SCNC: 4.7 MMOL/L (ref 3.5–5.1)
PROT SERPL-MCNC: 7.5 G/DL (ref 5.7–8.2)
SODIUM SERPL-SCNC: 144 MMOL/L (ref 136–145)

## 2024-11-14 PROCEDURE — 3080F DIAST BP >= 90 MM HG: CPT | Performed by: FAMILY MEDICINE

## 2024-11-14 PROCEDURE — 3077F SYST BP >= 140 MM HG: CPT | Performed by: FAMILY MEDICINE

## 2024-11-14 PROCEDURE — 80053 COMPREHEN METABOLIC PANEL: CPT | Performed by: FAMILY MEDICINE

## 2024-11-14 PROCEDURE — 3008F BODY MASS INDEX DOCD: CPT | Performed by: FAMILY MEDICINE

## 2024-11-14 PROCEDURE — 99214 OFFICE O/P EST MOD 30 MIN: CPT | Performed by: FAMILY MEDICINE

## 2024-11-14 PROCEDURE — 87389 HIV-1 AG W/HIV-1&-2 AB AG IA: CPT | Performed by: FAMILY MEDICINE

## 2024-11-14 PROCEDURE — G2211 COMPLEX E/M VISIT ADD ON: HCPCS | Performed by: FAMILY MEDICINE

## 2024-11-14 RX ORDER — EMTRICITABINE AND TENOFOVIR DISOPROXIL FUMARATE 200; 300 MG/1; MG/1
1 TABLET, FILM COATED ORAL DAILY
Qty: 90 TABLET | Refills: 0 | Status: SHIPPED | OUTPATIENT
Start: 2024-11-14

## 2024-11-14 NOTE — PROGRESS NOTES
FAMILY MEDICINE CLINIC NOTE    HPI  Yasir Burkett is a 58 year old male presenting for       #HIV risk  -truvada - needing refill  -no issues with medication    #Heel pain  -first thing in the morning  -ongoing for a couple weeks    #Pain in knees  -a couple weeks  -comes and goes  -mild  -no trauma or injury  -worse with going up and down stairs  -has been walking more lately     #HTN  -losartan 25 mg daily - has been off recently, paused to see if this is related to cough  -history of swelling with amlodipine and possible cough with lisinopril  -no issues currently     #Chronic cough  5/2024  -persistent cough for a few years  -eating certain foods - coughing spasm   -coffee, spicy foods, chocolate  -different from allergy symptoms   8/2024  -has not started omeprazole  11/2024  -CXR 9/2024 neg  -tried omeprazole, not helpful     #Seasonal allergies  -reports controlled          ---other     #Confusion  -reports general confusion and clumsiness  -bumps into things and knocks things over  -not sure if this is a fog - example - was driving at interaction with green light and started slowing down   -noticing this for a couple of years  -change in personality as well - less like himself  -notes stress - mother cancer, recently passed away  -denies depression  -referred to neurology   -has plans to see therapist    #Dermatitis around eye  -Dermatology Dr Julie Goldberg - eyelid dermatitis, rosacea  -gets crusty and \"raw\"  -somewhat itchy  -no pain  -hydrocortisone otc - seems to help   -not moisturizing      #HLD  -rosuvastatin 10 mg nightly   -lipid panel 5/2024     #Prediabetes  -A1c 6.2 5/2024     #Ear lesion-removed, resolved  - dermatology Dr Goldberg  -compound nevous - moderate atypia      #SK  #Cherry angiomas  #Numerous moles  -on face and arms and back     #Insomnia  -zolpidem 5mg PRN   -used it when flying, very rare           ROS  GENERAL: No fever/chills, no recent weight loss  HEENT: No visual changes, no  changes in hearing, no sore throats  NECK: No pain, no swelling  RESP: No cough, no SOB  CV: No chest pain, no palpitations  GI: No abd pain, no N/V/D  MSK: No edema, +pain  SKIN: No new rashes  NEURO: No numbness, no tingling, no headaches    HEALTH MAINTENANCE  Health Maintenance Topics with due status: Overdue       Topic Date Due    COVID-19 Vaccine 09/01/2024    Influenza Vaccine 10/01/2024       ALLERGIES  Allergies[1]    MEDICATIONS  Current Outpatient Medications   Medication Sig Dispense Refill    emtricitabine-tenofovir (TRUVADA) 200-300 MG Oral Tab Take 1 tablet by mouth daily. 90 tablet 0    rosuvastatin 10 MG Oral Tab Take 1 tablet (10 mg total) by mouth nightly. 90 tablet 3    losartan 25 MG Oral Tab Take 1 tablet (25 mg total) by mouth daily. 90 tablet 0    zolpidem 5 MG Oral Tab Take 1 tablet (5 mg total) by mouth nightly as needed for Sleep. 10 tablet 0       ACTIVE PROBLEMS  Patient Active Problem List   Diagnosis    Essential hypertension    Hyperlipidemia    Prediabetes    Health maintenance examination    Class 1 obesity due to excess calories with serious comorbidity and body mass index (BMI) of 33.0 to 33.9 in adult    Seborrheic keratosis    Cherry hemangioma    Numerous moles    Other insomnia    At risk for sexually transmitted disease due to partner with HIV    Seasonal allergies    Confusion    Dermatitis    Chronic cough    Patellofemoral pain syndrome of both knees    Plantar fasciitis       PAST MEDICAL HISTORY  Past Medical History:    Calculus of kidney    25 years ago    Diverticulosis of large intestine    Essential hypertension    Hyperlipidemia    Inguinal hernia    PONV (postoperative nausea and vomiting)    back in 2003    Rectus diastasis    Visual impairment    reading glasses       PAST SOCIAL HISTORY  Social History     Socioeconomic History    Marital status:      Spouse name: Not on file    Number of children: Not on file    Years of education: Not on file     Highest education level: Not on file   Occupational History    Not on file   Tobacco Use    Smoking status: Never    Smokeless tobacco: Never   Vaping Use    Vaping status: Never Used   Substance and Sexual Activity    Alcohol use: Yes     Comment: social once monthly    Drug use: No    Sexual activity: Not Currently     Partners: Male   Other Topics Concern    Not on file   Social History Narrative    Relationships:  - Manpreet Argueta (has HIV history).    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  mix.     Interests: Enjoys traveling. Movies - dramas, comedies. Reading - fiction, historical. Enjoys baking.    Spiritual: Not Uatsdin, not spiritual     Social Drivers of Health     Financial Resource Strain: Not on file   Food Insecurity: Not on file   Transportation Needs: Not on file   Physical Activity: Not on file   Stress: Not on file   Social Connections: Not on file   Housing Stability: Not on file       PAST SURGICAL HISTORY  Past Surgical History:   Procedure Laterality Date    Colonoscopy N/A 10/4/2019    Procedure: COLONOSCOPY;  Surgeon: John Stevens MD;  Location: [facility] ENDOSCOPY    Hernia surgery Left 1996    Hernia surgery Right 2002    Hernia surgery  2019    repair     Laparoscopic appendectomy N/A 10/16/2019    Ventral hernia repair  10/19/2020       PAST FAMILY HISTORY  Family History   Problem Relation Age of Onset    Hyperlipidemia Mother     Cancer Mother         Pancreatic and liver    Alcohol and Other Disorders Associated Father     No Known Problems Sister     Hypertension Sister     Hyperlipidemia Sister     Anxiety Sister     Depression Sister     Diabetes Maternal Grandmother     Obesity Maternal Grandmother     Hypertension Maternal Grandmother     No Known Problems Maternal Grandfather     Stroke Paternal Grandmother     No Known Problems Paternal Grandfather     Colon Cancer Neg      Prostate Cancer Neg          PHYSICAL EXAM  Vitals:    11/14/24 1717   BP: 144/90   Pulse: 80   Temp: 97.6 °F (36.4 °C)   SpO2: 98%   Weight: 247 lb (112 kg)   Height: 6' (1.829 m)      Body mass index is 33.5 kg/m².    GENERAL: NAD  RESP: Non-labored respirations, CTAB, no wheezing, no rales, no rhonchi  CV: RRR, no murmurs  MSK: No edema.  Full range of motion of the bilateral knees.  No tenderness palpation of the bilateral knees.  Anterior posterior drawer negative bilaterally.  Shahbaz's negative bilaterally.  Stable gait.  Mild tenderness palpation to the plantar aspect of the bilateral feet in the midfoot, slightly towards the heel.  No ankle tenderness to palpation.  No bony tenderness to palpation.  NEURO: Answering questions appropriately    LABS  Lab Results   Component Value Date    WBC 4.7 09/07/2023    HGB 15.3 09/07/2023    HCT 47.0 09/07/2023    .0 09/07/2023    NEPERCENT 65.1 09/07/2023    LYPERCENT 25.3 09/07/2023    MOPERCENT 7.7 09/07/2023    EOPERCENT 1.1 09/07/2023    BAPERCENT 0.6 09/07/2023    NE 3.06 09/07/2023    LYMABS 1.19 09/07/2023    MOABSO 0.36 09/07/2023    EOABSO 0.05 09/07/2023    BAABSO 0.03 09/07/2023       Lab Results   Component Value Date     08/10/2024    K 5.1 08/10/2024     08/10/2024    CO2 28.0 08/10/2024    ANIONGAP 6 08/10/2024    BUN 10 08/10/2024    CREATSERUM 1.31 (H) 08/10/2024    BUNCREA 7.6 (L) 08/10/2024     (H) 08/10/2024    CA 9.8 08/10/2024    OSMOCALC 294 08/10/2024    GFRNAA 70 12/18/2021    GFRAA 81 12/18/2021    ALT 25 08/10/2024    AST 23 08/10/2024    ALKPHO 52 08/10/2024    BILT 1.1 08/10/2024    TP 7.1 08/10/2024    ALB 4.4 08/10/2024    GLOBULIN 2.7 08/10/2024    ELECTAG 1.6 08/10/2024    FASTING Yes 08/10/2024         Lab Results   Component Value Date    CHOLEST 138 05/11/2024    TRIG 120 05/11/2024    HDL 38 (L) 05/11/2024    LDL 78 05/11/2024    VLDL 19 05/11/2024    NONHDLC 100 05/11/2024         DIAGNOSTICS      ASSESSMENT/PLAN  Problem List Items Addressed This Visit          Cardiac and Vasculature    Essential hypertension (Chronic)     Blood pressures are elevated  He has not been taking his medication  Advised restarting losartan 25 mg daily.  Send me blood pressure readings after a few days         Hyperlipidemia (Chronic)     Restart rosuvastatin 10 mg daily            Pulmonary and Pneumonias    Chronic cough - Primary     Patient with a chronic mild cough.  Does have a history of seasonal allergies, advised trying cetirizine and Flonase nasal spray again.  Potentially acid reflux related, however he reports trying omeprazole and did not have any improvement in his symptoms.  His chest x-ray was unremarkable.  Will check pulmonary function testing at this time.  Pending PFT result and if without improvement will plan to order a CT chest to further evaluate  Referral to pulmonology provided.  Can consider GI referral as well.  Follow-up as needed.         Relevant Orders    Complete PFT w/Bronchodilator/Albuterol 2.5    PULMONARY - INTERNAL       Musculoskeletal and Injuries    Patellofemoral pain syndrome of both knees     Patient symptoms consistent with patellofemoral syndrome of the bilateral knees.  Stretches and exercises provided  Can take Tylenol or Advil as needed for pain.  Follow-up as needed.         Plantar fasciitis     Patient symptoms and exam seem consistent with plantar fasciitis  Can use Tylenol Advil as needed for pain.  Stretches and exercises provided.  If without gradual improvement we will plan to refer to physical therapy moving forward.            Infectious Diseases    At risk for sexually transmitted disease due to partner with HIV     Continue Truvada.  Check CMP HIV screen.  Follow-up every 3 months for management.         Relevant Medications    emtricitabine-tenofovir (TRUVADA) 200-300 MG Oral Tab    Other Relevant Orders    Comp Metabolic Panel (14)    HIV Ag/Ab  Combo       EarNoseThroat    Seasonal allergies     This is well-controlled.  However given persistent cough, can try cetirizine and Flonase nasal spray to see if this helps with cough symptoms            Return in about 3 months (around 2025) for follow up.    No topic due editable text     Dorian Martínez MD  Family Medicine           Pre-chartin minutes  Reviewing/obtainin minutes  Medical Exam:1 minutes  Counseling/education: 10 minutes  Notes: 1 minutes  Referring/communicatin minutes  Care coordination: 0 minutes    My total time spent caring for the patient on the day of the encounter: 0 minutes         [1]   Allergies  Allergen Reactions    Radiology Contrast Iodinated Dyes SWELLING and FACE FLUSHING    Sulfa Antibiotics HIVES

## 2024-11-14 NOTE — PATIENT INSTRUCTIONS
PATIENT INSTRUCTIONS    Thank you for seeing me today, it was a pleasure taking care of you.  Please check out at the  and schedule a follow up appointment.  Return in about 3 months (around 2/14/2025) for follow up.  Please remember that the preferred gregory period for appointments is 5 minutes. This is to help maximize the amount of time that we can spend together at our visits.    Restart medications  Monitor your blood pressures when back on medication, shoot me a message after 5 days with your blood pressure readings  Pulmonary function testing  [phone].   See pulmonology   If testing negative, I will plan to get a CT scan of your chest   Try restarting cetirizine and flonase nasal spray  Stretches and exercises for knees and feet    Best,  Dr. Martínez

## 2024-11-15 NOTE — ASSESSMENT & PLAN NOTE
Patient with a chronic mild cough.  Does have a history of seasonal allergies, advised trying cetirizine and Flonase nasal spray again.  Potentially acid reflux related, however he reports trying omeprazole and did not have any improvement in his symptoms.  His chest x-ray was unremarkable.  Will check pulmonary function testing at this time.  Pending PFT result and if without improvement will plan to order a CT chest to further evaluate  Referral to pulmonology provided.  Can consider GI referral as well.  Follow-up as needed.

## 2024-11-15 NOTE — ASSESSMENT & PLAN NOTE
Patient symptoms consistent with patellofemoral syndrome of the bilateral knees.  Stretches and exercises provided  Can take Tylenol or Advil as needed for pain.  Follow-up as needed.

## 2024-11-15 NOTE — ASSESSMENT & PLAN NOTE
Patient symptoms and exam seem consistent with plantar fasciitis  Can use Tylenol Advil as needed for pain.  Stretches and exercises provided.  If without gradual improvement we will plan to refer to physical therapy moving forward.

## 2024-11-15 NOTE — ASSESSMENT & PLAN NOTE
Blood pressures are elevated  He has not been taking his medication  Advised restarting losartan 25 mg daily.  Send me blood pressure readings after a few days

## 2024-11-15 NOTE — ASSESSMENT & PLAN NOTE
This is well-controlled.  However given persistent cough, can try cetirizine and Flonase nasal spray to see if this helps with cough symptoms

## 2025-02-11 NOTE — PROGRESS NOTES
FAMILY MEDICINE CLINIC NOTE    HPI  Yasir Burkett is a 59 year old male presenting for     #HIV risk  -truvada - needing refill  -no issues with medication    #HTN  -losartan 25 mg daily - still off medication  -does not want to be on medication  -prefers to try lifestyle modifications  -history of swelling with amlodipine and possible cough with lisinopril     #HLD  -rosuvastatin 10 mg nightly - does not want to be on this medication   -CT calcium score 10/2019 1.7  -lipid panel 5/2024    #Chronic cough  -persistent cough for a few years  -eating certain foods - coughing spasm   -coffee, spicy foods, chocolate  -different from allergy symptoms   -CXR 9/2024 neg  -tried omeprazole, not helpful   -has not gotten PFTs    ----other         #Plantar fasciitis  -first thing in the morning  -ongoing for a couple weeks     #Patellofemoral syndrome  -a couple weeks  -comes and goes  -mild  -no trauma or injury  -worse with going up and down stairs  -has been walking more lately     #Seasonal allergies  -reports controlled     #Confusion  -reports general confusion and clumsiness  -bumps into things and knocks things over  -not sure if this is a fog - example - was driving at interaction with green light and started slowing down   -noticing this for a couple of years  -change in personality as well - less like himself  -notes stress - mother cancer, recently passed away  -denies depression  -referred to neurology   -has plans to see therapist    #Dermatitis around eye  -Dermatology Dr Julie Goldberg - eyelid dermatitis, rosacea  -gets crusty and \"raw\"  -somewhat itchy  -no pain  -hydrocortisone otc - seems to help   -not moisturizing     #Prediabetes  -A1c 6.2 5/2024     #Ear lesion-removed, resolved  - dermatology Dr Goldberg  -compound nevous - moderate atypia      #SK  #Cherry angiomas  #Numerous moles  -on face and arms and back     #Insomnia  -zolpidem 5mg PRN   -used it when flying, very rare      ROS  GENERAL: No  fever/chills, no recent weight loss  HEENT: No visual changes, no changes in hearing, no sore throats  NECK: No pain, no swelling  RESP: No cough, no SOB  CV: No chest pain, no palpitations  GI: No abd pain, no N/V/D  MSK: No edema  SKIN: No new rashes  NEURO: No numbness, no tingling, no headaches    HEALTH MAINTENANCE  Health Maintenance Topics with due status: Overdue       Topic Date Due    Pneumococcal Vaccine: 50+ Years Never done    HTN: BP Follow-Up 12/14/2024    Annual Depression Screening 01/01/2025     Health Maintenance Topics with due status: Due Soon       Topic Date Due    Annual Physical 05/11/2025       ALLERGIES  Allergies[1]    MEDICATIONS  Current Outpatient Medications   Medication Sig Dispense Refill    emtricitabine-tenofovir (TRUVADA) 200-300 MG Oral Tab Take 1 tablet by mouth daily. 90 tablet 0    losartan 25 MG Oral Tab Take 1 tablet (25 mg total) by mouth daily. 90 tablet 0    zolpidem 5 MG Oral Tab Take 1 tablet (5 mg total) by mouth nightly as needed for Sleep. 10 tablet 0       ACTIVE PROBLEMS  Patient Active Problem List   Diagnosis    Essential hypertension    Hyperlipidemia    Prediabetes    Health maintenance examination    Class 1 obesity due to excess calories with serious comorbidity and body mass index (BMI) of 34.0 to 34.9 in adult    Seborrheic keratosis    Cherry hemangioma    Numerous moles    Other insomnia    At risk for sexually transmitted disease due to partner with HIV    Seasonal allergies    Confusion    Dermatitis    Chronic cough    Patellofemoral pain syndrome of both knees    Plantar fasciitis       PAST MEDICAL HISTORY  Past Medical History:    Calculus of kidney    25 years ago    Diverticulosis of large intestine    Essential hypertension    Hyperlipidemia    Inguinal hernia    PONV (postoperative nausea and vomiting)    back in 2003    Rectus diastasis    Visual impairment    reading glasses       PAST SOCIAL HISTORY  Social History     Socioeconomic History     Marital status:      Spouse name: Not on file    Number of children: Not on file    Years of education: Not on file    Highest education level: Not on file   Occupational History    Not on file   Tobacco Use    Smoking status: Never    Smokeless tobacco: Never   Vaping Use    Vaping status: Never Used   Substance and Sexual Activity    Alcohol use: Yes     Comment: social once monthly    Drug use: No    Sexual activity: Not Currently     Partners: Male   Other Topics Concern    Not on file   Social History Narrative    Relationships:  - Manpreet Argueta (has HIV history).    Children: None    Pets: Fish    School: N/A    Work: Accounting, finance - [employer]    Origin: G[place] up in [facility].  mix.     Interests: Enjoys traveling. Movies - dramas, comedies. Reading - fiction, historical. Enjoys baking.    Spiritual: Not Sabianist, not spiritual     Social Drivers of Health     Food Insecurity: Not on file   Transportation Needs: Not on file   Stress: Not on file   Housing Stability: Not on file       PAST SURGICAL HISTORY  Past Surgical History:   Procedure Laterality Date    Colonoscopy N/A 10/4/2019    Procedure: COLONOSCOPY;  Surgeon: John Stevens MD;  Location: [facility] ENDOSCOPY    Hernia surgery Left 1996    Hernia surgery Right 2002    Hernia surgery  2019    repair     Laparoscopic appendectomy N/A 10/16/2019    Ventral hernia repair  10/19/2020       PAST FAMILY HISTORY  Family History   Problem Relation Age of Onset    Hyperlipidemia Mother     Cancer Mother         Pancreatic and liver    Alcohol and Other Disorders Associated Father     No Known Problems Sister     Hypertension Sister     Hyperlipidemia Sister     Anxiety Sister     Depression Sister     Diabetes Maternal Grandmother     Obesity Maternal Grandmother     Hypertension Maternal Grandmother     No Known Problems Maternal Grandfather     Stroke Paternal Grandmother     No Known Problems Paternal Grandfather      Colon Cancer Neg     Prostate Cancer Neg          PHYSICAL EXAM  Vitals:    02/13/25 1720   BP: (!) 152/92   Pulse: 86   Temp: 97 °F (36.1 °C)   SpO2: 99%   Weight: 251 lb (113.9 kg)   Height: 6' (1.829 m)      Body mass index is 34.04 kg/m².    GENERAL: NAD  NECK: Supple, non-tender  RESP: Non-labored respirations, CTAB, no wheezing, no rales, no rhonchi  CV: RRR, no murmurs  NEURO: Answering questions appropriately    LABS  Lab Results   Component Value Date    WBC 4.7 09/07/2023    HGB 15.3 09/07/2023    HCT 47.0 09/07/2023    .0 09/07/2023    NEPERCENT 65.1 09/07/2023    LYPERCENT 25.3 09/07/2023    MOPERCENT 7.7 09/07/2023    EOPERCENT 1.1 09/07/2023    BAPERCENT 0.6 09/07/2023    NE 3.06 09/07/2023    LYMABS 1.19 09/07/2023    MOABSO 0.36 09/07/2023    EOABSO 0.05 09/07/2023    BAABSO 0.03 09/07/2023       Lab Results   Component Value Date     11/14/2024    K 4.7 11/14/2024     11/14/2024    CO2 27.0 11/14/2024    ANIONGAP 7 11/14/2024    BUN 9 11/14/2024    CREATSERUM 1.20 11/14/2024    BUNCREA 7.5 (L) 11/14/2024     (H) 11/14/2024    CA 10.4 11/14/2024    OSMOCALC 299 (H) 11/14/2024    GFRNAA 70 12/18/2021    GFRAA 81 12/18/2021    ALT 23 11/14/2024    AST 22 11/14/2024    ALKPHO 55 11/14/2024    BILT 0.6 11/14/2024    TP 7.5 11/14/2024    ALB 4.7 11/14/2024    GLOBULIN 2.8 11/14/2024    ELECTAG 1.7 11/14/2024    FASTING No 11/14/2024         Lab Results   Component Value Date    CHOLEST 138 05/11/2024    TRIG 120 05/11/2024    HDL 38 (L) 05/11/2024    LDL 78 05/11/2024    VLDL 19 05/11/2024    NONHDLC 100 05/11/2024        DIAGNOSTICS      ASSESSMENT/PLAN  Problem List Items Addressed This Visit          Cardiac and Vasculature    Essential hypertension (Chronic)     Blood pressures are elevated  He has not been taking his medication  He is hesitant to be on medication.  Lifestyle modifications discussed  Importance of being on medication discussed.   Advised restarting losartan  25 mg daily.  Send me blood pressure readings after a few days - suspect will need to increase dosage.         Hyperlipidemia (Chronic)     Declines being on a statin  Lifestyle modifications discussed - diet and exercise.            Pulmonary and Pneumonias    Chronic cough     Patient with a chronic mild cough.  Does have a history of seasonal allergies, advised trying cetirizine and Flonase nasal spray again.  Potentially acid reflux related, however he reports trying omeprazole and did not have any improvement in his symptoms.  His chest x-ray was unremarkable.  Will check pulmonary function testing at this time - information provided  Pending PFT result and if without improvement will plan to order a CT chest to further evaluate  Can consider evaluation with pulmonology.  Can consider GI referral as well.  Follow-up as needed.            Endocrine and Metabolic    Class 1 obesity due to excess calories with serious comorbidity and body mass index (BMI) of 34.0 to 34.9 in adult     Healthy diet and exercise advised.  Check labs            Infectious Diseases    At risk for sexually transmitted disease due to partner with HIV - Primary     Can continue Truvada.  He is not sexually active with his partner.  His partner with HIV is also being adequately treated.   Discussed pros and cons of discontinuation.   If desiring to discontinue, I think this is reasonable, however the risk of transmission is not 0.   Check CMP HIV screen.  Follow-up every 3 months for management if desiring.         Relevant Medications    emtricitabine-tenofovir (TRUVADA) 200-300 MG Oral Tab    Other Relevant Orders    Comp Metabolic Panel (14)    HIV Ag/Ab Combo       Return in about 3 months (around 2025) for physical.    No topic due editable text     Dorian Martínez MD  Family Medicine           Pre-chartin minutes  Reviewing/obtaining: 10 minutes  Medical Exam:1 minutes  Counseling/education: 10 minutes  Notes: 5  minutes  Referring/communicatin minutes  Care coordination: 0 minutes    My total time spent caring for the patient on the day of the encounter: 28 minutes         [1]   Allergies  Allergen Reactions    Radiology Contrast Iodinated Dyes SWELLING and FACE FLUSHING    Sulfa Antibiotics HIVES

## 2025-02-13 ENCOUNTER — OFFICE VISIT (OUTPATIENT)
Dept: INTERNAL MEDICINE CLINIC | Facility: CLINIC | Age: 60
End: 2025-02-13
Payer: COMMERCIAL

## 2025-02-13 VITALS
WEIGHT: 251 LBS | HEART RATE: 86 BPM | BODY MASS INDEX: 34 KG/M2 | OXYGEN SATURATION: 99 % | TEMPERATURE: 97 F | DIASTOLIC BLOOD PRESSURE: 92 MMHG | HEIGHT: 72 IN | SYSTOLIC BLOOD PRESSURE: 152 MMHG

## 2025-02-13 DIAGNOSIS — E66.09 CLASS 1 OBESITY DUE TO EXCESS CALORIES WITH SERIOUS COMORBIDITY AND BODY MASS INDEX (BMI) OF 34.0 TO 34.9 IN ADULT: ICD-10-CM

## 2025-02-13 DIAGNOSIS — R05.3 CHRONIC COUGH: ICD-10-CM

## 2025-02-13 DIAGNOSIS — E66.811 CLASS 1 OBESITY DUE TO EXCESS CALORIES WITH SERIOUS COMORBIDITY AND BODY MASS INDEX (BMI) OF 34.0 TO 34.9 IN ADULT: ICD-10-CM

## 2025-02-13 DIAGNOSIS — E78.5 HYPERLIPIDEMIA, UNSPECIFIED HYPERLIPIDEMIA TYPE: Chronic | ICD-10-CM

## 2025-02-13 DIAGNOSIS — I10 ESSENTIAL HYPERTENSION: Chronic | ICD-10-CM

## 2025-02-13 DIAGNOSIS — Z91.89 AT RISK FOR SEXUALLY TRANSMITTED DISEASE DUE TO PARTNER WITH HIV: Primary | ICD-10-CM

## 2025-02-13 LAB
ALBUMIN SERPL-MCNC: 4.6 G/DL (ref 3.2–4.8)
ALBUMIN/GLOB SERPL: 1.6 {RATIO} (ref 1–2)
ALP LIVER SERPL-CCNC: 54 U/L
ALT SERPL-CCNC: 28 U/L
ANION GAP SERPL CALC-SCNC: 7 MMOL/L (ref 0–18)
AST SERPL-CCNC: 26 U/L (ref ?–34)
BILIRUB SERPL-MCNC: 0.4 MG/DL (ref 0.3–1.2)
BUN BLD-MCNC: 14 MG/DL (ref 9–23)
BUN/CREAT SERPL: 10.7 (ref 10–20)
CALCIUM BLD-MCNC: 9.7 MG/DL (ref 8.7–10.4)
CHLORIDE SERPL-SCNC: 105 MMOL/L (ref 98–112)
CO2 SERPL-SCNC: 28 MMOL/L (ref 21–32)
CREAT BLD-MCNC: 1.31 MG/DL
EGFRCR SERPLBLD CKD-EPI 2021: 63 ML/MIN/1.73M2 (ref 60–?)
FASTING STATUS PATIENT QL REPORTED: NO
GLOBULIN PLAS-MCNC: 2.8 G/DL (ref 2–3.5)
GLUCOSE BLD-MCNC: 104 MG/DL (ref 70–99)
OSMOLALITY SERPL CALC.SUM OF ELEC: 291 MOSM/KG (ref 275–295)
POTASSIUM SERPL-SCNC: 4.6 MMOL/L (ref 3.5–5.1)
PROT SERPL-MCNC: 7.4 G/DL (ref 5.7–8.2)
SODIUM SERPL-SCNC: 140 MMOL/L (ref 136–145)

## 2025-02-13 PROCEDURE — 3008F BODY MASS INDEX DOCD: CPT | Performed by: FAMILY MEDICINE

## 2025-02-13 PROCEDURE — 3077F SYST BP >= 140 MM HG: CPT | Performed by: FAMILY MEDICINE

## 2025-02-13 PROCEDURE — 80053 COMPREHEN METABOLIC PANEL: CPT | Performed by: FAMILY MEDICINE

## 2025-02-13 PROCEDURE — 87389 HIV-1 AG W/HIV-1&-2 AB AG IA: CPT | Performed by: FAMILY MEDICINE

## 2025-02-13 PROCEDURE — G2211 COMPLEX E/M VISIT ADD ON: HCPCS | Performed by: FAMILY MEDICINE

## 2025-02-13 PROCEDURE — 99214 OFFICE O/P EST MOD 30 MIN: CPT | Performed by: FAMILY MEDICINE

## 2025-02-13 PROCEDURE — 3080F DIAST BP >= 90 MM HG: CPT | Performed by: FAMILY MEDICINE

## 2025-02-13 RX ORDER — EMTRICITABINE AND TENOFOVIR DISOPROXIL FUMARATE 200; 300 MG/1; MG/1
1 TABLET, FILM COATED ORAL DAILY
Qty: 90 TABLET | Refills: 0 | Status: SHIPPED | OUTPATIENT
Start: 2025-02-13

## 2025-02-13 NOTE — PATIENT INSTRUCTIONS
PATIENT INSTRUCTIONS    Thank you for seeing me today, it was a pleasure taking care of you.  Please check out at the  and schedule a follow up appointment.  Return in about 3 months (around 5/13/2025) for physical.  Please remember that the preferred gregory period for appointments is 5 minutes. This is to help maximize the amount of time that we can spend together at our visits.    Restart medications  Restart losartan 25 mg daily   Monitor your blood pressures when back on medication, shoot me a message after 5 days with your blood pressure readings  Pulmonary function testing  [phone].   If testing negative, I will plan to get a CT scan of your chest   Try restarting cetirizine and flonase nasal spray    Dr. Mauricio Hodge

## 2025-02-13 NOTE — ASSESSMENT & PLAN NOTE
Can continue Truvada.  He is not sexually active with his partner.  His partner with HIV is also being adequately treated.   Discussed pros and cons of discontinuation.   If desiring to discontinue, I think this is reasonable, however the risk of transmission is not 0.   Check CMP HIV screen.  Follow-up every 3 months for management if desiring.

## 2025-02-13 NOTE — ASSESSMENT & PLAN NOTE
Blood pressures are elevated  He has not been taking his medication  He is hesitant to be on medication.  Lifestyle modifications discussed  Importance of being on medication discussed.   Advised restarting losartan 25 mg daily.  Send me blood pressure readings after a few days - suspect will need to increase dosage.

## 2025-02-13 NOTE — ASSESSMENT & PLAN NOTE
Patient with a chronic mild cough.  Does have a history of seasonal allergies, advised trying cetirizine and Flonase nasal spray again.  Potentially acid reflux related, however he reports trying omeprazole and did not have any improvement in his symptoms.  His chest x-ray was unremarkable.  Will check pulmonary function testing at this time - information provided  Pending PFT result and if without improvement will plan to order a CT chest to further evaluate  Can consider evaluation with pulmonology.  Can consider GI referral as well.  Follow-up as needed.

## 2025-05-08 DIAGNOSIS — I10 ESSENTIAL HYPERTENSION: Chronic | ICD-10-CM

## 2025-05-08 RX ORDER — LOSARTAN POTASSIUM 25 MG/1
25 TABLET ORAL DAILY
Qty: 90 TABLET | Refills: 0 | Status: SHIPPED | OUTPATIENT
Start: 2025-05-08

## 2025-08-04 DIAGNOSIS — I10 ESSENTIAL HYPERTENSION: Chronic | ICD-10-CM

## 2025-08-06 RX ORDER — LOSARTAN POTASSIUM 25 MG/1
25 TABLET ORAL DAILY
Qty: 90 TABLET | Refills: 1 | Status: SHIPPED | OUTPATIENT
Start: 2025-08-06

## (undated) DEVICE — SPONGE LAP 18X18 XRAY STRL

## (undated) DEVICE — GAMMEX® PI HYBRID SIZE 7.5, STERILE POWDER-FREE SURGICAL GLOVE, POLYISOPRENE AND NEOPRENE BLEND: Brand: GAMMEX

## (undated) DEVICE — SOL  .9 3000ML

## (undated) DEVICE — UNDYED BRAIDED (POLYGLACTIN 910), SYNTHETIC ABSORBABLE SUTURE: Brand: COATED VICRYL

## (undated) DEVICE — GAUZE SPONGES,12 PLY: Brand: CURITY

## (undated) DEVICE — SUTURE PDS II 4-0 RB-1

## (undated) DEVICE — CAUTERY BLADE 2IN INS E1455

## (undated) DEVICE — TROCAR: Brand: KII FIOS FIRST ENTRY

## (undated) DEVICE — MEDI-VAC NON-CONDUCTIVE SUCTION TUBING 6MM X 1.8M (6FT.) L: Brand: CARDINAL HEALTH

## (undated) DEVICE — SOL  .9 1000ML BTL

## (undated) DEVICE — SYRINGE MNJCT 10ML LF STRL REG

## (undated) DEVICE — 3M™ IOBAN™ 2 ANTIMICROBIAL INCISE DRAPE 6650EZ: Brand: IOBAN™ 2

## (undated) DEVICE — TISSUE RETRIEVAL SYSTEM: Brand: INZII RETRIEVAL SYSTEM

## (undated) DEVICE — ABDOMINAL PAD: Brand: CURITY

## (undated) DEVICE — SUTURE PDS II 0 CTX

## (undated) DEVICE — LINE MNTR ADLT SET O2 INTMD

## (undated) DEVICE — ETS45 RELOAD STANDARD 45MM: Brand: ENDOPATH

## (undated) DEVICE — Device: Brand: CUSTOM PROCEDURE KIT

## (undated) DEVICE — SUTURE VICRYL 3-0 SH

## (undated) DEVICE — SUTURE PROLENE 0 CT-1

## (undated) DEVICE — 35 ML SYRINGE REGULAR TIP: Brand: MONOJECT

## (undated) DEVICE — ENDOPATH ETS-FLEX45 ARTICULATING ENDOSCOPIC LINEAR CUTTER, NO RELOAD: Brand: ENDOPATH

## (undated) DEVICE — LIGASURE LAP MARYLAND 37CM

## (undated) DEVICE — SUTURE PDS II 0 CT-1

## (undated) DEVICE — SUTURE PDS II 3-0 SH

## (undated) DEVICE — FORCEP RADIAL JAW 4

## (undated) DEVICE — SUTURE PDS II 4-0 PS-2

## (undated) DEVICE — [HIGH FLOW INSUFFLATOR,  DO NOT USE IF PACKAGE IS DAMAGED,  KEEP DRY,  KEEP AWAY FROM SUNLIGHT,  PROTECT FROM HEAT AND RADIOACTIVE SOURCES.]: Brand: PNEUMOSURE

## (undated) DEVICE — ABDOMINAL BINDER: Brand: DEROYAL

## (undated) DEVICE — LAP CHOLE: Brand: MEDLINE INDUSTRIES, INC.

## (undated) DEVICE — TROCAR: Brand: KII® SLEEVE

## (undated) DEVICE — ENDOPATH ETS45 2.5MM RELOADS (VASCULAR/THIN): Brand: ENDOPATH

## (undated) DEVICE — 3M™ STERI-STRIP™ REINFORCED ADHESIVE SKIN CLOSURES, R1547, 1/2 IN X 4 IN (12 MM X 100 MM), 6 STRIPS/ENVELOPE: Brand: 3M™ STERI-STRIP™

## (undated) DEVICE — MINOR GENERAL: Brand: MEDLINE INDUSTRIES, INC.

## (undated) DEVICE — SUTURE VICRYL 0 UR-6

## (undated) DEVICE — TROCARS: Brand: KII® BALLOON BLUNT TIP SYSTEM

## (undated) DEVICE — INSULATED BLADE ELECTRODE 6.5

## (undated) DEVICE — Device: Brand: DEFENDO AIR/WATER/SUCTION AND BIOPSY VALVE

## (undated) NOTE — LETTER
7/2/2018              [address], [address]         Dear Jazmyne Gil,    [facility] records indicate that the blood tests ordered for you by Gail Alvarado MD  have not been done.   If you have, in fact, already complete

## (undated) NOTE — LETTER
4/15/2019              Lisy Oneal        [address] [place] [address], [address] [address]         Dear Chuck Beach,    [facility] records indicate that the lab tests ordered for you by Julianne Alejo MD  have not been done.   If you have, in fact, already completed

## (undated) NOTE — LETTER
[facility], [facility], [facility]  [address] [facility], [facility], [facility]  Dept: [phone]    7/16/2018        [place]        [address] [place] [address], [address] [address]             Dear García Mendes,    Our rec

## (undated) NOTE — LETTER
Kiana Borjas, [address] [facility] [place]       08/14/20        Patient: Surjit Waters   YOB: 1965   Date of Visit: 8/14/2020       Dear  Dr. Ashlie Robison MD,      Thank you for referring Surjit Waters to my practice.   Ple